# Patient Record
Sex: MALE | Race: WHITE | NOT HISPANIC OR LATINO | Employment: FULL TIME | URBAN - NONMETROPOLITAN AREA
[De-identification: names, ages, dates, MRNs, and addresses within clinical notes are randomized per-mention and may not be internally consistent; named-entity substitution may affect disease eponyms.]

---

## 2023-01-25 PROBLEM — I25.9 CARDIAC ISCHEMIA: Status: ACTIVE | Noted: 2023-01-25

## 2023-01-25 PROBLEM — F10.20 ALCOHOLISM (MULTI): Status: ACTIVE | Noted: 2023-01-25

## 2023-01-25 PROBLEM — F41.9 ANXIETY: Status: ACTIVE | Noted: 2023-01-25

## 2023-01-25 PROBLEM — F31.9 BIPOLAR DEPRESSION (MULTI): Status: ACTIVE | Noted: 2023-01-25

## 2023-01-25 PROBLEM — N52.9 ERECTILE DYSFUNCTION: Status: ACTIVE | Noted: 2023-01-25

## 2023-01-25 PROBLEM — F51.09 SITUATIONAL INSOMNIA: Status: ACTIVE | Noted: 2023-01-25

## 2023-01-25 PROBLEM — F10.10 ALCOHOL ABUSE: Status: ACTIVE | Noted: 2023-01-25

## 2023-01-25 PROBLEM — R79.89 LOW TESTOSTERONE: Status: ACTIVE | Noted: 2023-01-25

## 2023-01-25 PROBLEM — E66.3 OVERWEIGHT WITH BODY MASS INDEX (BMI) OF 28 TO 28.9 IN ADULT: Status: ACTIVE | Noted: 2023-01-25

## 2023-01-25 RX ORDER — CHOLECALCIFEROL (VITAMIN D3) 25 MCG
2 TABLET ORAL
COMMUNITY
End: 2023-03-16 | Stop reason: ALTCHOICE

## 2023-01-25 RX ORDER — BUSPIRONE HYDROCHLORIDE 15 MG/1
TABLET ORAL
COMMUNITY
End: 2023-03-16 | Stop reason: ALTCHOICE

## 2023-01-25 RX ORDER — IBUPROFEN 800 MG/1
800 TABLET ORAL EVERY 8 HOURS PRN
COMMUNITY
End: 2023-03-16 | Stop reason: ALTCHOICE

## 2023-01-25 RX ORDER — TRAZODONE HYDROCHLORIDE 50 MG/1
1 TABLET ORAL NIGHTLY PRN
COMMUNITY
Start: 2022-06-15 | End: 2023-03-16 | Stop reason: ALTCHOICE

## 2023-01-25 RX ORDER — TADALAFIL 20 MG/1
20 TABLET ORAL AS NEEDED
COMMUNITY
Start: 2022-02-28 | End: 2023-05-22 | Stop reason: SDUPTHER

## 2023-01-25 RX ORDER — LANOLIN ALCOHOL/MO/W.PET/CERES
1 CREAM (GRAM) TOPICAL NIGHTLY
COMMUNITY
Start: 2021-12-22 | End: 2023-03-16 | Stop reason: ALTCHOICE

## 2023-01-25 RX ORDER — IPRATROPIUM BROMIDE 21 UG/1
2 SPRAY, METERED NASAL 2 TIMES DAILY
COMMUNITY
Start: 2022-09-27 | End: 2023-03-16 | Stop reason: ALTCHOICE

## 2023-01-25 RX ORDER — MULTIVITAMIN
1 TABLET ORAL
COMMUNITY
End: 2023-03-16 | Stop reason: ALTCHOICE

## 2023-01-25 RX ORDER — FLUTICASONE PROPIONATE 50 MCG
2 SPRAY, SUSPENSION (ML) NASAL
COMMUNITY
Start: 2022-02-28 | End: 2023-03-16 | Stop reason: ALTCHOICE

## 2023-01-25 RX ORDER — TESTOSTERONE CYPIONATE 200 MG/ML
200 INJECTION, SOLUTION INTRAMUSCULAR
COMMUNITY
Start: 2021-12-22

## 2023-03-09 ENCOUNTER — APPOINTMENT (OUTPATIENT)
Dept: PRIMARY CARE | Facility: CLINIC | Age: 48
End: 2023-03-09
Payer: COMMERCIAL

## 2023-03-15 NOTE — PROGRESS NOTES
"Subjective   Patient ID: Jason Ceballos is a 47 y.o. male who presents for follow up on anxiety/bipolar depression    HPI   PHQ-9: 1 (0)  YVES-7: 8  Was referred to Psych at last visit and Vraylar was reduced in strength. Patient reports he took himself off of all of his medications several weeks ago and is no longer following with Psych. He states he feels great and is in a good place. He is having good quality of life, normal appetite and sleep has improved. He denies manic episodes and feels very \"level\".  He denies use of alcohol or cravings. He does go to weekly AA meetings.  He was doing an IOP at last visit as well. Now doing group therapy 1x/week and he feels this is sufficient at this time.   Recovering alcohol (completed detox)  He is doing well at work and getting promotions often. Does not feel overwhelmed at this time.  He has been able to go to the gym and exercise 1x/week and hopes to increase this to during the week as well.     C/o chest congestion  X week  States this feels instigated by allergies/irritation  Started a generic OTC allergy pill without much relief  +wheezing at times  Coughing up mucous in the morning  Denies fevers or shortness of breath    Review of Systems  See HPI    Objective   There were no vitals taken for this visit.-unable to obtain from patient this morning.    Physical Exam  Constitutional: Well developed, well nourished, alert and in no acute distress   Eyes: Normal external exam.  Pulmonary: No respiratory distress  Skin: No pallor  Neurologic: Cranial nerves II-XII grossly intact.   Psychiatric: Mood calm and affect normal. Answers questions appropriately.     Assessment/Plan   Continue your weekly counseling    Follow a healthy diet, avoid foods high in sugar/carb as this can cause irritability due to shifts in glucose level    Continue to exercise, goal is 5 days/week for 30 minutes    RESTART Atrovent nasal spray at night time before bed, perform nasal saline rinse in " the morning x 2 weeks. Please contact us if you have no improvement in your symptoms.

## 2023-03-16 ENCOUNTER — TELEMEDICINE (OUTPATIENT)
Dept: PRIMARY CARE | Facility: CLINIC | Age: 48
End: 2023-03-16
Payer: COMMERCIAL

## 2023-03-16 DIAGNOSIS — Z00.00 ENCOUNTER FOR WELLNESS EXAMINATION IN ADULT: ICD-10-CM

## 2023-03-16 DIAGNOSIS — E66.3 OVERWEIGHT WITH BODY MASS INDEX (BMI) OF 29 TO 29.9 IN ADULT: ICD-10-CM

## 2023-03-16 DIAGNOSIS — F41.9 ANXIETY: Primary | ICD-10-CM

## 2023-03-16 DIAGNOSIS — F51.09 SITUATIONAL INSOMNIA: ICD-10-CM

## 2023-03-16 DIAGNOSIS — F10.21 RECOVERING ALCOHOLIC IN REMISSION (MULTI): ICD-10-CM

## 2023-03-16 DIAGNOSIS — F31.9 BIPOLAR DEPRESSION (MULTI): ICD-10-CM

## 2023-03-16 PROBLEM — R53.81 MALAISE AND FATIGUE: Status: ACTIVE | Noted: 2023-03-16

## 2023-03-16 PROBLEM — R63.4 WEIGHT LOSS: Status: ACTIVE | Noted: 2023-03-16

## 2023-03-16 PROBLEM — R53.83 FATIGUE: Status: RESOLVED | Noted: 2023-03-16 | Resolved: 2023-03-16

## 2023-03-16 PROBLEM — R53.83 MALAISE AND FATIGUE: Status: RESOLVED | Noted: 2023-03-16 | Resolved: 2023-03-16

## 2023-03-16 PROBLEM — R53.81 MALAISE AND FATIGUE: Status: RESOLVED | Noted: 2023-03-16 | Resolved: 2023-03-16

## 2023-03-16 PROBLEM — R53.83 FATIGUE: Status: ACTIVE | Noted: 2023-03-16

## 2023-03-16 PROBLEM — F10.20 ALCOHOLISM (MULTI): Status: RESOLVED | Noted: 2023-01-25 | Resolved: 2023-03-16

## 2023-03-16 PROBLEM — R63.4 WEIGHT LOSS: Status: RESOLVED | Noted: 2023-03-16 | Resolved: 2023-03-16

## 2023-03-16 PROBLEM — R53.83 MALAISE AND FATIGUE: Status: ACTIVE | Noted: 2023-03-16

## 2023-03-16 PROCEDURE — 99213 OFFICE O/P EST LOW 20 MIN: CPT | Performed by: FAMILY MEDICINE

## 2023-03-16 RX ORDER — ACETAMINOPHEN 500 MG
1000 TABLET ORAL EVERY 8 HOURS PRN
COMMUNITY
Start: 2022-11-23 | End: 2023-06-20

## 2023-03-16 ASSESSMENT — ANXIETY QUESTIONNAIRES
7. FEELING AFRAID AS IF SOMETHING AWFUL MIGHT HAPPEN: SEVERAL DAYS
3. WORRYING TOO MUCH ABOUT DIFFERENT THINGS: SEVERAL DAYS
IF YOU CHECKED OFF ANY PROBLEMS ON THIS QUESTIONNAIRE, HOW DIFFICULT HAVE THESE PROBLEMS MADE IT FOR YOU TO DO YOUR WORK, TAKE CARE OF THINGS AT HOME, OR GET ALONG WITH OTHER PEOPLE: NOT DIFFICULT AT ALL
5. BEING SO RESTLESS THAT IT IS HARD TO SIT STILL: SEVERAL DAYS
1. FEELING NERVOUS, ANXIOUS, OR ON EDGE: SEVERAL DAYS
2. NOT BEING ABLE TO STOP OR CONTROL WORRYING: SEVERAL DAYS
4. TROUBLE RELAXING: MORE THAN HALF THE DAYS
6. BECOMING EASILY ANNOYED OR IRRITABLE: SEVERAL DAYS
GAD7 TOTAL SCORE: 8

## 2023-03-16 ASSESSMENT — PATIENT HEALTH QUESTIONNAIRE - PHQ9
7. TROUBLE CONCENTRATING ON THINGS, SUCH AS READING THE NEWSPAPER OR WATCHING TELEVISION: NOT AT ALL
3. TROUBLE FALLING OR STAYING ASLEEP OR SLEEPING TOO MUCH: NOT AT ALL
2. FEELING DOWN, DEPRESSED OR HOPELESS: NOT AT ALL
9. THOUGHTS THAT YOU WOULD BE BETTER OFF DEAD, OR OF HURTING YOURSELF: NOT AT ALL
SUM OF ALL RESPONSES TO PHQ QUESTIONS 1-9: 1
5. POOR APPETITE OR OVEREATING: NOT AT ALL
SUM OF ALL RESPONSES TO PHQ9 QUESTIONS 1 AND 2: 0
10. IF YOU CHECKED OFF ANY PROBLEMS, HOW DIFFICULT HAVE THESE PROBLEMS MADE IT FOR YOU TO DO YOUR WORK, TAKE CARE OF THINGS AT HOME, OR GET ALONG WITH OTHER PEOPLE: NOT DIFFICULT AT ALL
1. LITTLE INTEREST OR PLEASURE IN DOING THINGS: NOT AT ALL
4. FEELING TIRED OR HAVING LITTLE ENERGY: NOT AT ALL
6. FEELING BAD ABOUT YOURSELF - OR THAT YOU ARE A FAILURE OR HAVE LET YOURSELF OR YOUR FAMILY DOWN: SEVERAL DAYS
8. MOVING OR SPEAKING SO SLOWLY THAT OTHER PEOPLE COULD HAVE NOTICED. OR THE OPPOSITE, BEING SO FIGETY OR RESTLESS THAT YOU HAVE BEEN MOVING AROUND A LOT MORE THAN USUAL: NOT AT ALL

## 2023-05-22 DIAGNOSIS — N52.9 ERECTILE DYSFUNCTION, UNSPECIFIED ERECTILE DYSFUNCTION TYPE: Primary | ICD-10-CM

## 2023-05-22 RX ORDER — TADALAFIL 20 MG/1
20 TABLET ORAL AS NEEDED
Qty: 10 TABLET | Refills: 1 | Status: SHIPPED | OUTPATIENT
Start: 2023-05-22 | End: 2023-07-27 | Stop reason: SDUPTHER

## 2023-05-30 ENCOUNTER — APPOINTMENT (OUTPATIENT)
Dept: PRIMARY CARE | Facility: CLINIC | Age: 48
End: 2023-05-30
Payer: COMMERCIAL

## 2023-06-14 ENCOUNTER — TELEPHONE (OUTPATIENT)
Dept: PRIMARY CARE | Facility: CLINIC | Age: 48
End: 2023-06-14
Payer: COMMERCIAL

## 2023-06-14 NOTE — TELEPHONE ENCOUNTER
"Follow up order placed Dr. Becker now active-Patient cancelled his appt for 10-6-23 via [employer]: \"Canceled via [employer] (Hi Dr. Becker and team, I am doing very well - well-maintained mentally and physically. I have no complaints or issues. My energy is improving, am working out a few times a week, and diet is good. Doing well!)\"    This appointment was for PE-please contact patient and encourage reschedule of PE and educate that physicals are encouraged annually to maintain wellness.   "

## 2023-06-19 ENCOUNTER — TELEPHONE (OUTPATIENT)
Dept: PRIMARY CARE | Facility: CLINIC | Age: 48
End: 2023-06-19
Payer: COMMERCIAL

## 2023-06-19 NOTE — TELEPHONE ENCOUNTER
The patient was seen in the ER for Diverticulitis. He is scheduled with the provider for a follow up on Wednesday. Records have been pulled in [facility]. He is asking for a refill on Norco that he was given in the ER.  He took his last pill this morning and states he is still in a lot of distress.  He is leaving work and going home for the day.  He will follow up on Wednesday at the scheduled appointment, but would like a refill as the medication was helping with the pain. Please send to local pharmacy on file.

## 2023-06-19 NOTE — TELEPHONE ENCOUNTER
The patient was given the information. He will contact [facility] to have the records sent over. [facility] will not allow us to pull records.  No further questions at this time.

## 2023-06-20 ENCOUNTER — TELEPHONE (OUTPATIENT)
Dept: PRIMARY CARE | Facility: CLINIC | Age: 48
End: 2023-06-20
Payer: COMMERCIAL

## 2023-06-20 NOTE — TELEPHONE ENCOUNTER
We do not have access to records through [facility]. Patient was told to have records faxed to office.  Please be on the lookout

## 2023-06-20 NOTE — PROGRESS NOTES
"SUBJECTIVE:      Jason Rob Is 48 y.o.. male. Here for follow up office visit-     Chief Complaint   Patient presents with    Hospital Follow-up         HPI:      Dr Becker pt      How is pt doing today? He is still feeling \" beat up\" - pain improved but not gone       CT- sigmoid diverticulitis      Elevated WBC- 15     Cr 1.31     No fever - no vomiting     Now nausea - on abx     Follow up for hosp visit-  Diverticulitis - last Friday     Treated with- omnicef and flagyl - taking both abx       Dx- diverticulitis    Ok to refill norco for pain at night         REVIEW OF SYSTEMS:        OBJECTIVE:    Vitals:    06/21/23 1007   BP: 149/77   BP Location: Left arm   Patient Position: Sitting   BP Cuff Size: Large adult   Pulse: 75   Resp: 16   Temp: 36.7 °C (98 °F)   TempSrc: Temporal   SpO2: 95%   Weight: 96.7 kg (213 lb 3.2 oz)   Height: 1.778 m (5' 10\")       PHYSICAL:      Patient is alert and oriented x 3 , NAD  HEAD- normocephalic and atraumatic   EYES-conjunctiva-normal, lids - normal  EARS/NOSE- normal external exam   NECK-supple,FROM  CV- RRR without murmur  PULM- CTA bilaterally, normal respiratory effort  RESPIRATORY EFFORT- normal , no retractions or nasal flaring   ABD- normoactive BS's   EXT- no edema,NT  SKIN- mole right temple   NEURO- no focal deficits  PSYCH- pleasant, normal judgement and insight        The number and complexity of problems addressed is considered moderate.  The amount and/or complexity of data reviewed and analyzed is considered moderate. The risk of complications and/or morbidity/mortality of patient is considered moderate. Overall, this patient encounter is considered a moderate risk visit.          ASSESSMENT/PLAN:    Problem List Items Addressed This Visit    None  Visit Diagnoses       Hospital discharge follow-up    -  Primary    Diverticulitis        Relevant Orders    CBC and Auto Differential    Referral to Gastroenterology    Elevated serum creatinine        Relevant " Orders    Basic Metabolic Panel    Skin lesion        Relevant Orders    Referral to Dermatology            Orders Placed This Encounter   Procedures    CBC and Auto Differential    Basic Metabolic Panel    Referral to Gastroenterology    Referral to Dermatology               Continue medication      Ordered lab      Follow up with Dr Becker in October

## 2023-06-21 ENCOUNTER — OFFICE VISIT (OUTPATIENT)
Dept: PRIMARY CARE | Facility: CLINIC | Age: 48
End: 2023-06-21
Payer: COMMERCIAL

## 2023-06-21 ENCOUNTER — LAB (OUTPATIENT)
Dept: LAB | Facility: LAB | Age: 48
End: 2023-06-21
Payer: COMMERCIAL

## 2023-06-21 VITALS
RESPIRATION RATE: 16 BRPM | HEART RATE: 75 BPM | TEMPERATURE: 98 F | BODY MASS INDEX: 30.52 KG/M2 | OXYGEN SATURATION: 95 % | DIASTOLIC BLOOD PRESSURE: 77 MMHG | SYSTOLIC BLOOD PRESSURE: 149 MMHG | HEIGHT: 70 IN | WEIGHT: 213.2 LBS

## 2023-06-21 DIAGNOSIS — K57.92 DIVERTICULITIS: ICD-10-CM

## 2023-06-21 DIAGNOSIS — R79.89 ELEVATED SERUM CREATININE: ICD-10-CM

## 2023-06-21 DIAGNOSIS — L98.9 SKIN LESION: ICD-10-CM

## 2023-06-21 DIAGNOSIS — Z09 HOSPITAL DISCHARGE FOLLOW-UP: Primary | ICD-10-CM

## 2023-06-21 PROCEDURE — 36415 COLL VENOUS BLD VENIPUNCTURE: CPT

## 2023-06-21 PROCEDURE — 99214 OFFICE O/P EST MOD 30 MIN: CPT | Performed by: FAMILY MEDICINE

## 2023-06-21 PROCEDURE — 1036F TOBACCO NON-USER: CPT | Performed by: FAMILY MEDICINE

## 2023-06-21 PROCEDURE — 85025 COMPLETE CBC W/AUTO DIFF WBC: CPT

## 2023-06-21 PROCEDURE — 3008F BODY MASS INDEX DOCD: CPT | Performed by: FAMILY MEDICINE

## 2023-06-21 PROCEDURE — 80048 BASIC METABOLIC PNL TOTAL CA: CPT

## 2023-06-21 RX ORDER — HYDROCODONE BITARTRATE AND ACETAMINOPHEN 5; 325 MG/1; MG/1
TABLET ORAL
COMMUNITY
Start: 2023-06-16 | End: 2023-06-21 | Stop reason: SDUPTHER

## 2023-06-21 RX ORDER — METRONIDAZOLE 500 MG/1
500 TABLET ORAL 3 TIMES DAILY
Qty: 21 TABLET | Refills: 0 | COMMUNITY
Start: 2023-06-16 | End: 2023-06-23

## 2023-06-21 RX ORDER — CEFDINIR 300 MG/1
300 CAPSULE ORAL 2 TIMES DAILY
Qty: 14 CAPSULE | Refills: 0 | COMMUNITY
Start: 2023-06-16 | End: 2023-06-23

## 2023-06-21 RX ORDER — HYDROCODONE BITARTRATE AND ACETAMINOPHEN 5; 325 MG/1; MG/1
1 TABLET ORAL EVERY 6 HOURS PRN
Qty: 20 TABLET | Refills: 0 | Status: SHIPPED | OUTPATIENT
Start: 2023-06-21 | End: 2023-06-26

## 2023-06-22 LAB
ANION GAP IN SER/PLAS: 18 MMOL/L (ref 10–20)
BASOPHILS (10*3/UL) IN BLOOD BY AUTOMATED COUNT: 0.05 X10E9/L (ref 0–0.1)
BASOPHILS/100 LEUKOCYTES IN BLOOD BY AUTOMATED COUNT: 0.7 % (ref 0–2)
CALCIUM (MG/DL) IN SER/PLAS: 10.6 MG/DL (ref 8.6–10.6)
CARBON DIOXIDE, TOTAL (MMOL/L) IN SER/PLAS: 27 MMOL/L (ref 21–32)
CHLORIDE (MMOL/L) IN SER/PLAS: 100 MMOL/L (ref 98–107)
CREATININE (MG/DL) IN SER/PLAS: 1.45 MG/DL (ref 0.5–1.3)
EOSINOPHILS (10*3/UL) IN BLOOD BY AUTOMATED COUNT: 0.15 X10E9/L (ref 0–0.7)
EOSINOPHILS/100 LEUKOCYTES IN BLOOD BY AUTOMATED COUNT: 2.1 % (ref 0–6)
ERYTHROCYTE DISTRIBUTION WIDTH (RATIO) BY AUTOMATED COUNT: 12.5 % (ref 11.5–14.5)
ERYTHROCYTE MEAN CORPUSCULAR HEMOGLOBIN CONCENTRATION (G/DL) BY AUTOMATED: 32.6 G/DL (ref 32–36)
ERYTHROCYTE MEAN CORPUSCULAR VOLUME (FL) BY AUTOMATED COUNT: 94 FL (ref 80–100)
ERYTHROCYTES (10*6/UL) IN BLOOD BY AUTOMATED COUNT: 6.07 X10E12/L (ref 4.5–5.9)
GFR MALE: 59 ML/MIN/1.73M2
GLUCOSE (MG/DL) IN SER/PLAS: 95 MG/DL (ref 74–99)
HEMATOCRIT (%) IN BLOOD BY AUTOMATED COUNT: 57.1 % (ref 41–52)
HEMOGLOBIN (G/DL) IN BLOOD: 18.6 G/DL (ref 13.5–17.5)
IMMATURE GRANULOCYTES/100 LEUKOCYTES IN BLOOD BY AUTOMATED COUNT: 0.7 % (ref 0–0.9)
LEUKOCYTES (10*3/UL) IN BLOOD BY AUTOMATED COUNT: 7.1 X10E9/L (ref 4.4–11.3)
LYMPHOCYTES (10*3/UL) IN BLOOD BY AUTOMATED COUNT: 2.15 X10E9/L (ref 1.2–4.8)
LYMPHOCYTES/100 LEUKOCYTES IN BLOOD BY AUTOMATED COUNT: 30.2 % (ref 13–44)
MONOCYTES (10*3/UL) IN BLOOD BY AUTOMATED COUNT: 0.59 X10E9/L (ref 0.1–1)
MONOCYTES/100 LEUKOCYTES IN BLOOD BY AUTOMATED COUNT: 8.3 % (ref 2–10)
NEUTROPHILS (10*3/UL) IN BLOOD BY AUTOMATED COUNT: 4.12 X10E9/L (ref 1.2–7.7)
NEUTROPHILS/100 LEUKOCYTES IN BLOOD BY AUTOMATED COUNT: 58 % (ref 40–80)
NRBC (PER 100 WBCS) BY AUTOMATED COUNT: 0 /100 WBC (ref 0–0)
PLATELETS (10*3/UL) IN BLOOD AUTOMATED COUNT: 361 X10E9/L (ref 150–450)
POTASSIUM (MMOL/L) IN SER/PLAS: 5.2 MMOL/L (ref 3.5–5.3)
SODIUM (MMOL/L) IN SER/PLAS: 140 MMOL/L (ref 136–145)
UREA NITROGEN (MG/DL) IN SER/PLAS: 16 MG/DL (ref 6–23)

## 2023-06-24 DIAGNOSIS — D75.1 POLYCYTHEMIA: ICD-10-CM

## 2023-06-24 DIAGNOSIS — N18.9 CHRONIC KIDNEY DISEASE, UNSPECIFIED CKD STAGE: Primary | ICD-10-CM

## 2023-06-24 PROBLEM — F41.1 GAD (GENERALIZED ANXIETY DISORDER): Status: ACTIVE | Noted: 2022-06-24

## 2023-06-26 ENCOUNTER — TELEPHONE (OUTPATIENT)
Dept: PRIMARY CARE | Facility: CLINIC | Age: 48
End: 2023-06-26
Payer: COMMERCIAL

## 2023-06-26 DIAGNOSIS — Z00.00 HEALTHCARE MAINTENANCE: Primary | ICD-10-CM

## 2023-06-26 DIAGNOSIS — R94.4 DECREASED GFR: ICD-10-CM

## 2023-06-26 NOTE — TELEPHONE ENCOUNTER
----- Message from Valentine Durand MA sent at 6/26/2023  9:00 AM EDT -----  Regarding: FW: Your Recent Visit  Contact: [phone]    ----- Message -----  From: Jason Ceballos  Sent: 6/26/2023   8:28 AM EDT  To: [facility] [place] [id number] Clinical Support Staff  Subject: Your Recent Visit                                Good morning,    I scheduled an appointment for a kidney doc follow up; it's in mid-September in [place].  If you feel this appointment is needed sooner, please advise.   I know there's a scheduling department there at your office.  Thanks so much!    Edgard potts I see the gastro doc this Thurs.

## 2023-06-26 NOTE — TELEPHONE ENCOUNTER
Pls have pt recheck his kidneys nonfasting ok in about 2 weeks       Labs from hospital / follow up show some decrease in kidney function.  Many things can lead to this finding, such as dehydration, medication side effects, high blood pressure, diabetes, and use of pain relievers called NSAIDs, which include ibuprofen and naproxen.      advise good BP control, avoiding NSAIDs, and adequate hydration as measures to further protect kidneys.

## 2023-07-17 ENCOUNTER — LAB (OUTPATIENT)
Dept: LAB | Facility: LAB | Age: 48
End: 2023-07-17
Payer: COMMERCIAL

## 2023-07-17 DIAGNOSIS — R94.4 DECREASED GFR: ICD-10-CM

## 2023-07-17 LAB
ANION GAP IN SER/PLAS: 16 MMOL/L (ref 10–20)
CALCIUM (MG/DL) IN SER/PLAS: 9.7 MG/DL (ref 8.6–10.6)
CARBON DIOXIDE, TOTAL (MMOL/L) IN SER/PLAS: 28 MMOL/L (ref 21–32)
CHLORIDE (MMOL/L) IN SER/PLAS: 102 MMOL/L (ref 98–107)
CREATININE (MG/DL) IN SER/PLAS: 1.36 MG/DL (ref 0.5–1.3)
GFR MALE: 64 ML/MIN/1.73M2
GLUCOSE (MG/DL) IN SER/PLAS: 97 MG/DL (ref 74–99)
POTASSIUM (MMOL/L) IN SER/PLAS: 4.9 MMOL/L (ref 3.5–5.3)
SODIUM (MMOL/L) IN SER/PLAS: 141 MMOL/L (ref 136–145)
UREA NITROGEN (MG/DL) IN SER/PLAS: 16 MG/DL (ref 6–23)

## 2023-07-17 PROCEDURE — 80048 BASIC METABOLIC PNL TOTAL CA: CPT

## 2023-07-17 PROCEDURE — 36415 COLL VENOUS BLD VENIPUNCTURE: CPT

## 2023-07-18 ENCOUNTER — TELEPHONE (OUTPATIENT)
Dept: PRIMARY CARE | Facility: CLINIC | Age: 48
End: 2023-07-18
Payer: COMMERCIAL

## 2023-07-18 DIAGNOSIS — N18.9 CHRONIC KIDNEY DISEASE, UNSPECIFIED CKD STAGE: Primary | ICD-10-CM

## 2023-07-18 NOTE — TELEPHONE ENCOUNTER
----- Message from Jael Howe MA sent at 7/18/2023  7:08 AM EDT -----  Regarding: FW: Labs follow up - kidneys still high  Contact: [phone]    ----- Message -----  From: Jason Ceballos  Sent: 7/17/2023   8:18 PM EDT  To: [facility] [place] [id number] Clinical Support Staff  Subject: Labs follow up - kidneys still high              FYI I have been taking creatine supplements and drinking about 10 cups of coffee a day.     It's my belief my numbers will continue to fall in the coming weeks based on proper hydration and dietary changes.     For whatever that's worth.  : )

## 2023-07-27 DIAGNOSIS — N52.9 ERECTILE DYSFUNCTION, UNSPECIFIED ERECTILE DYSFUNCTION TYPE: ICD-10-CM

## 2023-07-27 RX ORDER — TADALAFIL 20 MG/1
20 TABLET ORAL AS NEEDED
Qty: 10 TABLET | Refills: 1 | Status: SHIPPED | OUTPATIENT
Start: 2023-07-27 | End: 2023-10-06 | Stop reason: SDUPTHER

## 2023-08-30 DIAGNOSIS — R05.1 ACUTE COUGH: Primary | ICD-10-CM

## 2023-08-30 RX ORDER — AZITHROMYCIN 250 MG/1
TABLET, FILM COATED ORAL
Qty: 6 TABLET | Refills: 0 | Status: SHIPPED | OUTPATIENT
Start: 2023-08-30 | End: 2023-09-04

## 2023-09-06 ENCOUNTER — OFFICE VISIT (OUTPATIENT)
Dept: PRIMARY CARE | Facility: CLINIC | Age: 48
End: 2023-09-06
Payer: COMMERCIAL

## 2023-09-06 ENCOUNTER — LAB (OUTPATIENT)
Dept: LAB | Facility: LAB | Age: 48
End: 2023-09-06
Payer: COMMERCIAL

## 2023-09-06 VITALS
TEMPERATURE: 98.7 F | RESPIRATION RATE: 14 BRPM | WEIGHT: 198.9 LBS | SYSTOLIC BLOOD PRESSURE: 131 MMHG | HEART RATE: 88 BPM | OXYGEN SATURATION: 95 % | DIASTOLIC BLOOD PRESSURE: 80 MMHG | BODY MASS INDEX: 28.54 KG/M2

## 2023-09-06 DIAGNOSIS — R79.89 LOW TESTOSTERONE: ICD-10-CM

## 2023-09-06 DIAGNOSIS — Z11.59 NEED FOR HEPATITIS C SCREENING TEST: ICD-10-CM

## 2023-09-06 DIAGNOSIS — R63.4 WEIGHT LOSS: ICD-10-CM

## 2023-09-06 DIAGNOSIS — R05.2 SUBACUTE COUGH: ICD-10-CM

## 2023-09-06 DIAGNOSIS — Z11.4 ENCOUNTER FOR SCREENING FOR HIV: ICD-10-CM

## 2023-09-06 DIAGNOSIS — R53.83 FATIGUE, UNSPECIFIED TYPE: Primary | ICD-10-CM

## 2023-09-06 DIAGNOSIS — R53.83 FATIGUE, UNSPECIFIED TYPE: ICD-10-CM

## 2023-09-06 LAB
ALANINE AMINOTRANSFERASE (SGPT) (U/L) IN SER/PLAS: 35 U/L (ref 10–52)
ALBUMIN (G/DL) IN SER/PLAS: 4.9 G/DL (ref 3.4–5)
ALKALINE PHOSPHATASE (U/L) IN SER/PLAS: 46 U/L (ref 33–120)
ANION GAP IN SER/PLAS: 15 MMOL/L (ref 10–20)
ASPARTATE AMINOTRANSFERASE (SGOT) (U/L) IN SER/PLAS: 25 U/L (ref 9–39)
BASOPHILS (10*3/UL) IN BLOOD BY AUTOMATED COUNT: 0.05 X10E9/L (ref 0–0.1)
BASOPHILS/100 LEUKOCYTES IN BLOOD BY AUTOMATED COUNT: 0.6 % (ref 0–2)
BILIRUBIN TOTAL (MG/DL) IN SER/PLAS: 0.5 MG/DL (ref 0–1.2)
C REACTIVE PROTEIN (MG/L) IN SER/PLAS: <0.1 MG/DL
CALCIUM (MG/DL) IN SER/PLAS: 10.4 MG/DL (ref 8.6–10.6)
CARBON DIOXIDE, TOTAL (MMOL/L) IN SER/PLAS: 26 MMOL/L (ref 21–32)
CHLORIDE (MMOL/L) IN SER/PLAS: 102 MMOL/L (ref 98–107)
COBALAMIN (VITAMIN B12) (PG/ML) IN SER/PLAS: 842 PG/ML (ref 211–911)
CORTISOL (UG/DL) IN SERUM - AM: 15.2 UG/DL (ref 5–20)
CREATININE (MG/DL) IN SER/PLAS: 1.45 MG/DL (ref 0.5–1.3)
EOSINOPHILS (10*3/UL) IN BLOOD BY AUTOMATED COUNT: 0.15 X10E9/L (ref 0–0.7)
EOSINOPHILS/100 LEUKOCYTES IN BLOOD BY AUTOMATED COUNT: 1.7 % (ref 0–6)
ERYTHROCYTE DISTRIBUTION WIDTH (RATIO) BY AUTOMATED COUNT: 12.7 % (ref 11.5–14.5)
ERYTHROCYTE MEAN CORPUSCULAR HEMOGLOBIN CONCENTRATION (G/DL) BY AUTOMATED: 34.2 G/DL (ref 32–36)
ERYTHROCYTE MEAN CORPUSCULAR VOLUME (FL) BY AUTOMATED COUNT: 91 FL (ref 80–100)
ERYTHROCYTES (10*6/UL) IN BLOOD BY AUTOMATED COUNT: 6.17 X10E12/L (ref 4.5–5.9)
FERRITIN (UG/LL) IN SER/PLAS: 93 UG/L (ref 20–300)
GFR MALE: 59 ML/MIN/1.73M2
GLUCOSE (MG/DL) IN SER/PLAS: 95 MG/DL (ref 74–99)
HEMATOCRIT (%) IN BLOOD BY AUTOMATED COUNT: 56.1 % (ref 41–52)
HEMOGLOBIN (G/DL) IN BLOOD: 19.2 G/DL (ref 13.5–17.5)
HEPATITIS C VIRUS AB PRESENCE IN SERUM: NONREACTIVE
HIV 1/ 2 AG/AB SCREEN: NONREACTIVE
IGA (MG/DL) IN SER/PLAS: 367 MG/DL (ref 70–400)
IGG (MG/DL) IN SER/PLAS: 1070 MG/DL (ref 700–1600)
IGM (MG/DL) IN SER/PLAS: 46 MG/DL (ref 40–230)
IMMATURE GRANULOCYTES/100 LEUKOCYTES IN BLOOD BY AUTOMATED COUNT: 0.3 % (ref 0–0.9)
IRON (UG/DL) IN SER/PLAS: 158 UG/DL (ref 35–150)
IRON BINDING CAPACITY (UG/DL) IN SER/PLAS: 328 UG/DL (ref 240–445)
IRON SATURATION (%) IN SER/PLAS: 48 % (ref 25–45)
LEUKOCYTES (10*3/UL) IN BLOOD BY AUTOMATED COUNT: 8.7 X10E9/L (ref 4.4–11.3)
LYMPHOCYTES (10*3/UL) IN BLOOD BY AUTOMATED COUNT: 2.04 X10E9/L (ref 1.2–4.8)
LYMPHOCYTES/100 LEUKOCYTES IN BLOOD BY AUTOMATED COUNT: 23.5 % (ref 13–44)
MONOCYTES (10*3/UL) IN BLOOD BY AUTOMATED COUNT: 0.69 X10E9/L (ref 0.1–1)
MONOCYTES/100 LEUKOCYTES IN BLOOD BY AUTOMATED COUNT: 7.9 % (ref 2–10)
NEUTROPHILS (10*3/UL) IN BLOOD BY AUTOMATED COUNT: 5.72 X10E9/L (ref 1.2–7.7)
NEUTROPHILS/100 LEUKOCYTES IN BLOOD BY AUTOMATED COUNT: 66 % (ref 40–80)
NRBC (PER 100 WBCS) BY AUTOMATED COUNT: 0 /100 WBC (ref 0–0)
PLATELETS (10*3/UL) IN BLOOD AUTOMATED COUNT: 270 X10E9/L (ref 150–450)
POTASSIUM (MMOL/L) IN SER/PLAS: 4.9 MMOL/L (ref 3.5–5.3)
PROTEIN TOTAL: 7.5 G/DL (ref 6.4–8.2)
SEDIMENTATION RATE, ERYTHROCYTE: 9 MM/H (ref 0–15)
SODIUM (MMOL/L) IN SER/PLAS: 138 MMOL/L (ref 136–145)
TESTOSTERONE (NG/DL) IN SER/PLAS: >3000 NG/DL (ref 240–1000)
THYROTROPIN (MIU/L) IN SER/PLAS BY DETECTION LIMIT <= 0.05 MIU/L: 1.77 MIU/L (ref 0.44–3.98)
UREA NITROGEN (MG/DL) IN SER/PLAS: 14 MG/DL (ref 6–23)

## 2023-09-06 PROCEDURE — 83540 ASSAY OF IRON: CPT

## 2023-09-06 PROCEDURE — 83550 IRON BINDING TEST: CPT

## 2023-09-06 PROCEDURE — 3008F BODY MASS INDEX DOCD: CPT | Performed by: FAMILY MEDICINE

## 2023-09-06 PROCEDURE — 86038 ANTINUCLEAR ANTIBODIES: CPT

## 2023-09-06 PROCEDURE — 82728 ASSAY OF FERRITIN: CPT

## 2023-09-06 PROCEDURE — 85652 RBC SED RATE AUTOMATED: CPT

## 2023-09-06 PROCEDURE — 82784 ASSAY IGA/IGD/IGG/IGM EACH: CPT

## 2023-09-06 PROCEDURE — 82607 VITAMIN B-12: CPT

## 2023-09-06 PROCEDURE — 82668 ASSAY OF ERYTHROPOIETIN: CPT

## 2023-09-06 PROCEDURE — 1036F TOBACCO NON-USER: CPT | Performed by: FAMILY MEDICINE

## 2023-09-06 PROCEDURE — 86803 HEPATITIS C AB TEST: CPT

## 2023-09-06 PROCEDURE — 99214 OFFICE O/P EST MOD 30 MIN: CPT | Performed by: FAMILY MEDICINE

## 2023-09-06 PROCEDURE — 86140 C-REACTIVE PROTEIN: CPT

## 2023-09-06 PROCEDURE — 80053 COMPREHEN METABOLIC PANEL: CPT

## 2023-09-06 PROCEDURE — 85025 COMPLETE CBC W/AUTO DIFF WBC: CPT

## 2023-09-06 PROCEDURE — 82533 TOTAL CORTISOL: CPT

## 2023-09-06 PROCEDURE — 36415 COLL VENOUS BLD VENIPUNCTURE: CPT

## 2023-09-06 PROCEDURE — 84443 ASSAY THYROID STIM HORMONE: CPT

## 2023-09-06 PROCEDURE — 87389 HIV-1 AG W/HIV-1&-2 AB AG IA: CPT

## 2023-09-06 PROCEDURE — 84403 ASSAY OF TOTAL TESTOSTERONE: CPT

## 2023-09-06 RX ORDER — LANOLIN ALCOHOL/MO/W.PET/CERES
1 CREAM (GRAM) TOPICAL NIGHTLY
COMMUNITY
Start: 2021-12-22 | End: 2024-02-27 | Stop reason: WASHOUT

## 2023-09-06 RX ORDER — CHOLECALCIFEROL (VITAMIN D3) 25 MCG
2 TABLET ORAL DAILY
COMMUNITY

## 2023-09-06 RX ORDER — IBUPROFEN 800 MG/1
1 TABLET ORAL EVERY 8 HOURS PRN
COMMUNITY
Start: 2022-11-23 | End: 2023-10-26 | Stop reason: WASHOUT

## 2023-09-06 RX ORDER — MULTIVITAMIN
1 TABLET ORAL DAILY
COMMUNITY

## 2023-09-06 NOTE — ASSESSMENT & PLAN NOTE
Uncertain why has prolonged symptoms with illness  Check immune related labs  Check fatigue related labs    Consider immunology referral if persists without anwers

## 2023-09-06 NOTE — PROGRESS NOTES
"Subjective   Patient ID: Jason Ceballos is a 48 y.o. male who presents for Follow-up.    Refused flu shot     Patient is here for an urgent care follow up.  Was seen on 08/23/2023 for the flu.   Was on Azithromycin and that did not work.   Still having deep fatigue,mild dizziness, a lot of chest congestion.  Productive cough, SOB. Took three one hour naps.  Just noticed his last 10 pounds, no appetite     Currenting taking OTC cold and flu meds     Worried has immune system issue  Over last 1-2 years has had long lasting illness  - respiratory \"chest\" and fatigue  Currently extreme fatigue, napping a lot  Lack appetite - lost 10 lbs  Both wife and son similar illness but resolved  Zpak seemed to help initially    6 months ago missed 2 weeks work due to similar illness    No tobacco    H/o alcohol abuse  Current 9 mos abstinent    H/o bipolar disorder    Felt fine in between illness - good energy, work 10-12 days    Discussed low T last checked - he stated he purposely dosed low so would test low to prove need for replacement  Worries he is underdosed vs what a tst clinic would do (had high T levels prior to 2/2023 low level - he was over dosing from prescribed)  Reports has followed prescribe dose for at least last month                 Review of Systems    Objective   /80 (BP Location: Left arm, Patient Position: Sitting, BP Cuff Size: Adult)   Pulse 88   Temp 37.1 °C (98.7 °F) (Temporal)   Resp 14   Wt 90.2 kg (198 lb 14.4 oz)   SpO2 95%   BMI 28.54 kg/m²     Physical Exam  Vitals reviewed.   Constitutional:       Appearance: Normal appearance.   HENT:      Head: Normocephalic.      Right Ear: Tympanic membrane, ear canal and external ear normal.      Left Ear: Tympanic membrane, ear canal and external ear normal.      Nose: Nose normal.      Mouth/Throat:      Mouth: Mucous membranes are dry.      Pharynx: Oropharynx is clear.   Eyes:      Conjunctiva/sclera: Conjunctivae normal.      Pupils: Pupils " are equal, round, and reactive to light.   Cardiovascular:      Rate and Rhythm: Normal rate and regular rhythm.      Heart sounds: Normal heart sounds.   Pulmonary:      Effort: Pulmonary effort is normal.      Breath sounds: Normal breath sounds.   Musculoskeletal:      Cervical back: Neck supple.   Lymphadenopathy:      Head:      Right side of head: No submandibular adenopathy.      Left side of head: No submandibular adenopathy.      Cervical: No cervical adenopathy.   Skin:     General: Skin is warm and dry.   Neurological:      Mental Status: He is alert.         Assessment/Plan   Problem List Items Addressed This Visit       Low testosterone     Check levels -most recent 2/2023 low at 184           Relevant Orders    Testosterone    Weight loss    Relevant Orders    Immunoglobulins, IgG, IgA, IgM    Subacute cough     Not resolved with initial viral supportive treatment and recent zpak    Normal auscultation    Chest xray ordered since prolonged         Relevant Orders    CBC and Auto Differential    Immunoglobulins, IgG, IgA, IgM    XR chest 2 views    Fatigue - Primary     Uncertain why has prolonged symptoms with illness  Check immune related labs  Check fatigue related labs    Consider immunology referral if persists without anwers             Relevant Orders    Comprehensive Metabolic Panel    CBC and Auto Differential    Iron and TIBC    Ferritin    TSH with reflex to Free T4 if abnormal    Vitamin B12    SHUKRI with Reflex to DOMONIQUE    C-Reactive Protein    Sedimentation Rate    Immunoglobulins, IgG, IgA, IgM    Cortisol AM     Other Visit Diagnoses       Encounter for screening for HIV        Relevant Orders    HIV 1/2 Antigen/Antibody Screen with Reflex to Confirmation    Need for hepatitis C screening test        Relevant Orders    Hepatitis C Antibody

## 2023-09-06 NOTE — ASSESSMENT & PLAN NOTE
Not resolved with initial viral supportive treatment and recent zpak    Normal auscultation    Chest xray ordered since prolonged

## 2023-09-07 ENCOUNTER — TELEPHONE (OUTPATIENT)
Dept: PRIMARY CARE | Facility: CLINIC | Age: 48
End: 2023-09-07
Payer: COMMERCIAL

## 2023-09-07 LAB — ANTI-NUCLEAR ANTIBODY (ANA): NEGATIVE

## 2023-09-07 NOTE — TELEPHONE ENCOUNTER
Pt called in checking the status on his labs. Pt just wanted to give you a friendly reminder to not forget about him. Pt is requesting a cb at [phone].

## 2023-09-07 NOTE — TELEPHONE ENCOUNTER
3 labs remain pending     We will contact him once we have results    Current resulted labs normal except for his high testosterone and high hemoglobin as already informed

## 2023-09-08 ENCOUNTER — TELEPHONE (OUTPATIENT)
Dept: PRIMARY CARE | Facility: CLINIC | Age: 48
End: 2023-09-08
Payer: COMMERCIAL

## 2023-09-08 DIAGNOSIS — J01.90 ACUTE NON-RECURRENT SINUSITIS, UNSPECIFIED LOCATION: Primary | ICD-10-CM

## 2023-09-08 LAB — ERYTHROPOIETIN: 5 MU/ML (ref 4–27)

## 2023-09-08 RX ORDER — AMOXICILLIN AND CLAVULANATE POTASSIUM 875; 125 MG/1; MG/1
TABLET, FILM COATED ORAL
Qty: 14 TABLET | Refills: 0 | Status: SHIPPED | OUTPATIENT
Start: 2023-09-08 | End: 2023-10-06 | Stop reason: WASHOUT

## 2023-09-08 NOTE — TELEPHONE ENCOUNTER
Patient came in and was seen on Wed by Dr. Hale and he had a lot blood work done.  He now thinks that his congestion is due to a sinus infection. Wanted to know if someone could send in RX for Antiboitics to [facility] in [facility] [phone]

## 2023-09-11 ENCOUNTER — TELEPHONE (OUTPATIENT)
Dept: PRIMARY CARE | Facility: CLINIC | Age: 48
End: 2023-09-11
Payer: COMMERCIAL

## 2023-09-11 DIAGNOSIS — R06.02 SHORTNESS OF BREATH: ICD-10-CM

## 2023-09-11 DIAGNOSIS — R53.83 OTHER FATIGUE: ICD-10-CM

## 2023-09-11 DIAGNOSIS — R05.2 SUBACUTE COUGH: ICD-10-CM

## 2023-09-11 DIAGNOSIS — D75.1 POLYCYTHEMIA: Primary | ICD-10-CM

## 2023-09-13 ENCOUNTER — TELEPHONE (OUTPATIENT)
Dept: PRIMARY CARE | Facility: CLINIC | Age: 48
End: 2023-09-13
Payer: COMMERCIAL

## 2023-10-06 ENCOUNTER — NUTRITION (OUTPATIENT)
Dept: HEMATOLOGY/ONCOLOGY | Facility: CLINIC | Age: 48
End: 2023-10-06

## 2023-10-06 ENCOUNTER — APPOINTMENT (OUTPATIENT)
Dept: PRIMARY CARE | Facility: CLINIC | Age: 48
End: 2023-10-06
Payer: COMMERCIAL

## 2023-10-06 ENCOUNTER — OFFICE VISIT (OUTPATIENT)
Dept: PRIMARY CARE | Facility: CLINIC | Age: 48
End: 2023-10-06
Payer: COMMERCIAL

## 2023-10-06 ENCOUNTER — INFUSION (OUTPATIENT)
Dept: HEMATOLOGY/ONCOLOGY | Facility: CLINIC | Age: 48
End: 2023-10-06
Payer: COMMERCIAL

## 2023-10-06 VITALS
HEIGHT: 70 IN | OXYGEN SATURATION: 97 % | WEIGHT: 200.4 LBS | BODY MASS INDEX: 28.69 KG/M2 | DIASTOLIC BLOOD PRESSURE: 84 MMHG | TEMPERATURE: 98.5 F | SYSTOLIC BLOOD PRESSURE: 129 MMHG | HEART RATE: 88 BPM | RESPIRATION RATE: 14 BRPM

## 2023-10-06 VITALS
DIASTOLIC BLOOD PRESSURE: 96 MMHG | WEIGHT: 200.07 LBS | RESPIRATION RATE: 20 BRPM | SYSTOLIC BLOOD PRESSURE: 141 MMHG | OXYGEN SATURATION: 94 % | TEMPERATURE: 97.9 F | BODY MASS INDEX: 28.71 KG/M2 | HEART RATE: 86 BPM

## 2023-10-06 DIAGNOSIS — F31.9 BIPOLAR DEPRESSION (MULTI): Chronic | ICD-10-CM

## 2023-10-06 DIAGNOSIS — D75.1 POLYCYTHEMIA: Chronic | ICD-10-CM

## 2023-10-06 DIAGNOSIS — Z00.00 ENCOUNTER FOR WELLNESS EXAMINATION IN ADULT: ICD-10-CM

## 2023-10-06 DIAGNOSIS — D75.1 POLYCYTHEMIA: Primary | ICD-10-CM

## 2023-10-06 DIAGNOSIS — R79.89 LOW TESTOSTERONE: ICD-10-CM

## 2023-10-06 DIAGNOSIS — N18.31 STAGE 3A CHRONIC KIDNEY DISEASE (MULTI): ICD-10-CM

## 2023-10-06 DIAGNOSIS — D75.1 POLYCYTHEMIA: ICD-10-CM

## 2023-10-06 DIAGNOSIS — E66.3 OVERWEIGHT WITH BODY MASS INDEX (BMI) OF 28 TO 28.9 IN ADULT: Chronic | ICD-10-CM

## 2023-10-06 DIAGNOSIS — Z00.00 ENCOUNTER FOR WELLNESS EXAMINATION IN ADULT: Primary | ICD-10-CM

## 2023-10-06 DIAGNOSIS — N52.9 ERECTILE DYSFUNCTION, UNSPECIFIED ERECTILE DYSFUNCTION TYPE: ICD-10-CM

## 2023-10-06 PROBLEM — R53.83 FATIGUE: Status: RESOLVED | Noted: 2023-09-06 | Resolved: 2023-10-06

## 2023-10-06 PROBLEM — R63.4 WEIGHT LOSS: Status: RESOLVED | Noted: 2023-09-06 | Resolved: 2023-10-06

## 2023-10-06 LAB
BASOPHILS # BLD AUTO: 0.03 X10*3/UL (ref 0–0.1)
BASOPHILS NFR BLD AUTO: 0.5 %
EOSINOPHIL # BLD AUTO: 0.13 X10*3/UL (ref 0–0.7)
EOSINOPHIL NFR BLD AUTO: 2 %
ERYTHROCYTE [DISTWIDTH] IN BLOOD BY AUTOMATED COUNT: 12.7 % (ref 11.5–14.5)
HCT VFR BLD AUTO: 49.3 % (ref 41–52)
HGB BLD-MCNC: 17.1 G/DL (ref 13.5–17.5)
IMM GRANULOCYTES # BLD AUTO: 0.02 X10*3/UL (ref 0–0.7)
IMM GRANULOCYTES NFR BLD AUTO: 0.3 % (ref 0–0.9)
LYMPHOCYTES # BLD AUTO: 1.91 X10*3/UL (ref 1.2–4.8)
LYMPHOCYTES NFR BLD AUTO: 29.1 %
MCH RBC QN AUTO: 30.6 PG (ref 26–34)
MCHC RBC AUTO-ENTMCNC: 34.7 G/DL (ref 32–36)
MCV RBC AUTO: 88 FL (ref 80–100)
MONOCYTES # BLD AUTO: 0.57 X10*3/UL (ref 0.1–1)
MONOCYTES NFR BLD AUTO: 8.7 %
NEUTROPHILS # BLD AUTO: 3.91 X10*3/UL (ref 1.2–7.7)
NEUTROPHILS NFR BLD AUTO: 59.4 %
NRBC BLD-RTO: NORMAL /100{WBCS}
PLATELET # BLD AUTO: 261 X10*3/UL (ref 150–450)
PMV BLD AUTO: 8.9 FL (ref 7.5–11.5)
RBC # BLD AUTO: 5.58 X10*6/UL (ref 4.5–5.9)
WBC # BLD AUTO: 6.6 X10*3/UL (ref 4.4–11.3)

## 2023-10-06 PROCEDURE — 99396 PREV VISIT EST AGE 40-64: CPT | Performed by: FAMILY MEDICINE

## 2023-10-06 PROCEDURE — 3008F BODY MASS INDEX DOCD: CPT | Performed by: FAMILY MEDICINE

## 2023-10-06 PROCEDURE — 85025 COMPLETE CBC W/AUTO DIFF WBC: CPT

## 2023-10-06 PROCEDURE — 82728 ASSAY OF FERRITIN: CPT

## 2023-10-06 PROCEDURE — 80048 BASIC METABOLIC PNL TOTAL CA: CPT

## 2023-10-06 PROCEDURE — 99195 PHLEBOTOMY: CPT

## 2023-10-06 PROCEDURE — 36415 COLL VENOUS BLD VENIPUNCTURE: CPT

## 2023-10-06 PROCEDURE — 1036F TOBACCO NON-USER: CPT | Performed by: FAMILY MEDICINE

## 2023-10-06 PROCEDURE — 80061 LIPID PANEL: CPT

## 2023-10-06 PROCEDURE — 83540 ASSAY OF IRON: CPT

## 2023-10-06 RX ORDER — EPINEPHRINE 0.3 MG/.3ML
0.3 INJECTION SUBCUTANEOUS EVERY 5 MIN PRN
Status: CANCELLED | OUTPATIENT
Start: 2023-10-06

## 2023-10-06 RX ORDER — DIPHENHYDRAMINE HYDROCHLORIDE 50 MG/ML
50 INJECTION INTRAMUSCULAR; INTRAVENOUS AS NEEDED
Status: CANCELLED | OUTPATIENT
Start: 2023-10-06

## 2023-10-06 RX ORDER — HEPARIN SODIUM,PORCINE/PF 10 UNIT/ML
50 SYRINGE (ML) INTRAVENOUS AS NEEDED
Status: CANCELLED | OUTPATIENT
Start: 2023-10-06

## 2023-10-06 RX ORDER — TADALAFIL 20 MG/1
20 TABLET ORAL AS NEEDED
Qty: 10 TABLET | Refills: 1 | Status: SHIPPED | OUTPATIENT
Start: 2023-10-06 | End: 2023-11-06 | Stop reason: SDUPTHER

## 2023-10-06 RX ORDER — ALBUTEROL SULFATE 0.83 MG/ML
3 SOLUTION RESPIRATORY (INHALATION) AS NEEDED
Status: CANCELLED | OUTPATIENT
Start: 2023-10-06

## 2023-10-06 RX ORDER — FAMOTIDINE 10 MG/ML
20 INJECTION INTRAVENOUS ONCE AS NEEDED
Status: CANCELLED | OUTPATIENT
Start: 2023-10-06

## 2023-10-06 RX ORDER — HEPARIN 100 UNIT/ML
500 SYRINGE INTRAVENOUS AS NEEDED
Status: CANCELLED | OUTPATIENT
Start: 2023-10-06

## 2023-10-06 ASSESSMENT — PATIENT HEALTH QUESTIONNAIRE - PHQ9
1. LITTLE INTEREST OR PLEASURE IN DOING THINGS: NOT AT ALL
10. IF YOU CHECKED OFF ANY PROBLEMS, HOW DIFFICULT HAVE THESE PROBLEMS MADE IT FOR YOU TO DO YOUR WORK, TAKE CARE OF THINGS AT HOME, OR GET ALONG WITH OTHER PEOPLE: NOT DIFFICULT AT ALL
7. TROUBLE CONCENTRATING ON THINGS, SUCH AS READING THE NEWSPAPER OR WATCHING TELEVISION: 0
6. FEELING BAD ABOUT YOURSELF - OR THAT YOU ARE A FAILURE OR HAVE LET YOURSELF OR YOUR FAMILY DOWN: NOT AT ALL
4. FEELING TIRED OR HAVING LITTLE ENERGY: NOT AT ALL
2. FEELING DOWN, DEPRESSED OR HOPELESS: NOT AT ALL
6. FEELING BAD ABOUT YOURSELF - OR THAT YOU ARE A FAILURE OR HAVE LET YOURSELF OR YOUR FAMILY DOWN: 0
SUM OF ALL RESPONSES TO PHQ QUESTIONS 1-9: 0
8. MOVING OR SPEAKING SO SLOWLY THAT OTHER PEOPLE COULD HAVE NOTICED. OR THE OPPOSITE, BEING SO FIGETY OR RESTLESS THAT YOU HAVE BEEN MOVING AROUND A LOT MORE THAN USUAL: NOT AT ALL
1. LITTLE INTEREST OR PLEASURE IN DOING THINGS: 0
3. TROUBLE FALLING OR STAYING ASLEEP OR SLEEPING TOO MUCH: 0
3. TROUBLE FALLING OR STAYING ASLEEP OR SLEEPING TOO MUCH: NOT AT ALL
9. THOUGHTS THAT YOU WOULD BE BETTER OFF DEAD, OR OF HURTING YOURSELF: 0
2. FEELING DOWN, DEPRESSED, IRRITABLE, OR HOPELESS: NOT AT ALL
5. POOR APPETITE OR OVEREATING: 0
2. FEELING DOWN, DEPRESSED, IRRITABLE, OR HOPELESS: 0
SUM OF ALL RESPONSES TO PHQ QUESTIONS 1-9: 0
8. MOVING OR SPEAKING SO SLOWLY THAT OTHER PEOPLE COULD HAVE NOTICED. OR THE OPPOSITE, BEING SO FIGETY OR RESTLESS THAT YOU HAVE BEEN MOVING AROUND A LOT MORE THAN USUAL: 0
3. TROUBLE FALLING OR STAYING ASLEEP OR SLEEPING TOO MUCH: NOT AT ALL
10. IF YOU CHECKED OFF ANY PROBLEMS, HOW DIFFICULT HAVE THESE PROBLEMS MADE IT FOR YOU TO DO YOUR WORK, TAKE CARE OF THINGS AT HOME, OR GET ALONG WITH OTHER PEOPLE: NOT DIFFICULT AT ALL
5. POOR APPETITE OR OVEREATING: NOT AT ALL
8. MOVING OR SPEAKING SO SLOWLY THAT OTHER PEOPLE COULD HAVE NOTICED. OR THE OPPOSITE, BEING SO FIGETY OR RESTLESS THAT YOU HAVE BEEN MOVING AROUND A LOT MORE THAN USUAL: NOT AT ALL
4. FEELING TIRED OR HAVING LITTLE ENERGY: 0
1. LITTLE INTEREST OR PLEASURE IN DOING THINGS: NOT AT ALL
7. TROUBLE CONCENTRATING ON THINGS, SUCH AS READING THE NEWSPAPER OR WATCHING TELEVISION: NOT AT ALL
6. FEELING BAD ABOUT YOURSELF - OR THAT YOU ARE A FAILURE OR HAVE LET YOURSELF OR YOUR FAMILY DOWN: NOT AT ALL
9. THOUGHTS THAT YOU WOULD BE BETTER OFF DEAD, OR OF HURTING YOURSELF: NOT AT ALL
9. THOUGHTS THAT YOU WOULD BE BETTER OFF DEAD, OR OF HURTING YOURSELF: NOT AT ALL
5. POOR APPETITE OR OVEREATING: NOT AT ALL
7. TROUBLE CONCENTRATING ON THINGS, SUCH AS READING THE NEWSPAPER OR WATCHING TELEVISION: NOT AT ALL
4. FEELING TIRED OR HAVING LITTLE ENERGY: NOT AT ALL

## 2023-10-06 ASSESSMENT — PAIN SCALES - GENERAL: PAINLEVEL: 0-NO PAIN

## 2023-10-06 NOTE — PROGRESS NOTES
NUTRITION Follow-up NOTE (Education)     Reason for Visit:  Jason Ceballos is a 48 y.o. male who presents for phlebotomy  Met with patient today for nutrition follow up.  Follows with Dr. Mason for elevated hemoglobin/hematocrit       Anthropometrics:    Wt Readings from Last 10 Encounters:   10/06/23 90.8 kg (200 lb 1.1 oz)   10/06/23 90.9 kg (200 lb 6.4 oz)   09/06/23 90.2 kg (198 lb 14.4 oz)   07/27/23 95.4 kg (210 lb 5.1 oz)   06/29/23 95 kg (209 lb 8 oz)   06/21/23 96.7 kg (213 lb 3.2 oz)   01/26/23 90.9 kg (200 lb 6 oz)   01/12/23 89.7 kg (197 lb 12.8 oz)   10/10/22 92.5 kg (204 lb)   09/27/22 99.8 kg (220 lb)     Lab Results   Component Value Date    WBC 6.6 10/06/2023    HGB 17.1 10/06/2023    HCT 49.3 10/06/2023    MCV 88 10/06/2023     10/06/2023        Lab Results   Component Value Date    IRON 179 (H) 09/22/2023    TIBC 327 09/22/2023    FERRITIN 81 09/22/2023            Food And Nutrient Intake:  Food and Nutrient History  Food and Nutrient History: Patient states since our last conversation he has eliminated vitamin C supplemementation.  We had reviewed previously, that this can enhance iron absorption . Patient trying to focus more on poultry, eggs, and dairy for protein, but is somewhat hesitant to cut out red meat as it is part of his diet for  muscle building and work out regimen. He has been doing some research on the Mediterranean diet and is trying to implement more leafy greens, whole grains, olive oil , avocados, and is trying to reduce red meat intake.       Interventions/Recommendations   Nutrition Education  Nutrition Education Content: Content related nutrition education  Goals: -Patient to limit red meat (due to heme iron content) to once weekly    -Reviewed with patient it is encouraged to limit red meat, as it contains mostly heme iron, which is the most easily absorbed form of iron.  Encouraged alternative protein sources including fish, poultry, eggs, nuts, dairy. Eggs/dairy   can impair absorption of iron  -Continue with avoiding vitamin C supplementation, to reduce iron absorption  -Discussed that plant based or Mediterranean diet is encouraged, where the focus of the diet is not centered around meat as the meal's foundation.   -Reviewed balance and implementing small changes, by limiting red meat (as it is a big part of work-out regimen) ,if unable to avoid completely, as diet interventions alone,do not treat elevated iron/hemoglobin, but in addition to medical treatment, can help avoid further complications       Monitoring and Evaluation     Readiness to Change : Fair  Anticipated Compliant : Fair

## 2023-10-06 NOTE — PROGRESS NOTES
"Subjective   Patient ID: Jason Ceballos is a 48 y.o. male who presents for a wellness examination. Patient declines flu vaccine today.       HPI   Diet: well rounded  Supplements/vitamins: see list  Alcohol use: none x 10 mo  Caffeine intake: Y, pill, 4-5 cups/day  Exercise: not currently  Sleep: no issues  Last dental appointment: routinely  Last eye appointment: glasses, 2 years ago  Anxiety/Depression: see below   Cscope: 7/27/23, repeat in 10 years  Fmhx colon cancer: N  CT cardiac score:  Tobacco use/Lung cancer screening: N  Recreational drug use: N  Immunizations: due for Tdap     PHQ-9: 0  YVES-7: 1  Not currently taking any medications.   Not currently doing counseling    CKD-3  Evaluated by Nephrology-told this is due to dehydration. No further follow up.    Was taking ibuprofen frequently    Taken testosterone for a decade, now following with Urology and hematology. Patient would like to see Endocrinology for continuation of his testosterone care.     Review of Systems   All other systems reviewed and are negative.    Objective   /84 (BP Location: Left arm, Patient Position: Sitting, BP Cuff Size: Large adult)   Pulse 88   Temp 36.9 °C (98.5 °F) (Temporal)   Resp 14   Ht 1.778 m (5' 10\")   Wt 90.9 kg (200 lb 6.4 oz)   SpO2 97%   BMI 28.75 kg/m²     Physical Exam  Constitutional: Well developed, well nourished, alert and in no acute distress.  Head and Face: Normocephalic, atraumatic.  Eyes: Normal external exam. Pupils equally round and reactive to light with normal accommodation and extraocular movements intact.   ENT: External inspection of ears normal, tympanic membranes visualized and normal. Nasal mucosa, septum, and turbinates normal. Oral mucosa moist, oropharynx clear.   Neck: Supple, no lymphadenopathy or masses. Thyroid not enlarged, no palpable nodules.   Cardiovascular: Regular rate and rhythm, normal S1 and S2, no murmurs, gallops, or rubs. Radial pulses normal. No peripheral " edema. No carotid bruits.   Pulmonary: No respiratory distress, lungs clear to auscultation bilaterally. No wheezes, rhonchi, rales.  Abdomen: Soft, nontender, nondistended, normal bowel sounds. No masses palpated. No hernias.  Musculoskeletal: Gait normal. Muscle strength/tone normal. Normal range of motion of all extremities.   Skin: Warm, well perfused, normal skin turgor, non-inflammed SK located on right temple area measuring 3mm x 1mm   Neurologic: Cranial nerves II-XII grossly intact. Deep tendon reflexes were 2+ and symmetric. Sensation normal bilaterally.   Psychiatric: Mood calm and affect normal.    Assessment/Plan    Recommendations for men annual wellness exam:   Colonoscopy at age 50 or earlier if positive family history of colon cancer   Discuss prostate cancer screening between ages 55-69 or sooner if family history of prostate cancer   One time screen for abdominal aortic aneurysm for men ages 65-75 who have ever smoked  Screening for lung cancer with low-dose CT in all adults age 55-77 who have a 30 pack-year smoking history and currently smoke or have quit within the past 15 years  Follow a healthy diet (Dash diet, Mediterranean diet)  Exercise 150 min/wk  Maintain healthy weight (BMI < 25)-your BMI is 28.   Do not smoke   Alcohol in moderation (up to 2 drinks/day)   Get enough sleep (7-8 hours/night)  Make sure immunizations are up to date (influenza, pneumococcal, Tdap, shingles if age > 50)-declines Tdap and flu shot today.   Visit dentist twice yearly    Schedule shave biopsy SK    Reviewed recent labs, non-fasting labs ordered for today    Referral to Endocrinology     Follow up in 1 year

## 2023-10-06 NOTE — PROGRESS NOTES
Phleb initiated at 0925, completed at 0934.   0947 patient declined observation period.  VSS. Observed for dizziness prior to discharge.  Instructed patient to increase oral fluids next 72 hours.  Patient independently ambulatory off unit in NAD and without complaints.  Gait steady.  Call back instructions reviewed.  Patient verbalized understanding.

## 2023-10-07 LAB
ANION GAP SERPL CALC-SCNC: 13 MMOL/L (ref 10–20)
BUN SERPL-MCNC: 17 MG/DL (ref 6–23)
CALCIUM SERPL-MCNC: 10.5 MG/DL (ref 8.6–10.6)
CHLORIDE SERPL-SCNC: 104 MMOL/L (ref 98–107)
CHOLEST SERPL-MCNC: 181 MG/DL (ref 0–199)
CHOLESTEROL/HDL RATIO: 3.2
CO2 SERPL-SCNC: 27 MMOL/L (ref 21–32)
CREAT SERPL-MCNC: 1.21 MG/DL (ref 0.5–1.3)
FERRITIN SERPL-MCNC: 45 NG/ML (ref 20–300)
GFR SERPL CREATININE-BSD FRML MDRD: 74 ML/MIN/1.73M*2
GLUCOSE SERPL-MCNC: 114 MG/DL (ref 74–99)
HDLC SERPL-MCNC: 57 MG/DL
IRON SATN MFR SERPL: 48 % (ref 25–45)
IRON SERPL-MCNC: 164 UG/DL (ref 35–150)
LDLC SERPL CALC-MCNC: 106 MG/DL (ref 140–190)
NON HDL CHOLESTEROL: 124 MG/DL (ref 0–149)
POTASSIUM SERPL-SCNC: 4.5 MMOL/L (ref 3.5–5.3)
SODIUM SERPL-SCNC: 139 MMOL/L (ref 136–145)
TIBC SERPL-MCNC: 343 UG/DL (ref 240–445)
TRIGL SERPL-MCNC: 90 MG/DL (ref 0–149)
UIBC SERPL-MCNC: 179 UG/DL (ref 110–370)
VLDL: 18 MG/DL (ref 0–40)

## 2023-10-20 ENCOUNTER — INFUSION (OUTPATIENT)
Dept: HEMATOLOGY/ONCOLOGY | Facility: CLINIC | Age: 48
End: 2023-10-20
Payer: COMMERCIAL

## 2023-10-20 VITALS
TEMPERATURE: 98.6 F | SYSTOLIC BLOOD PRESSURE: 137 MMHG | HEART RATE: 101 BPM | RESPIRATION RATE: 18 BRPM | DIASTOLIC BLOOD PRESSURE: 86 MMHG | OXYGEN SATURATION: 96 % | WEIGHT: 209.44 LBS | BODY MASS INDEX: 30.05 KG/M2

## 2023-10-20 DIAGNOSIS — D58.2 OTHER HEMOGLOBINOPATHIES (CMS-HCC): ICD-10-CM

## 2023-10-20 LAB
BASOPHILS # BLD AUTO: 0.05 X10*3/UL (ref 0–0.1)
BASOPHILS NFR BLD AUTO: 0.4 %
EOSINOPHIL # BLD AUTO: 0.1 X10*3/UL (ref 0–0.7)
EOSINOPHIL NFR BLD AUTO: 0.8 %
ERYTHROCYTE [DISTWIDTH] IN BLOOD BY AUTOMATED COUNT: 12.8 % (ref 11.5–14.5)
HCT VFR BLD AUTO: 44.8 % (ref 41–52)
HGB BLD-MCNC: 15.5 G/DL (ref 13.5–17.5)
IMM GRANULOCYTES # BLD AUTO: 0.05 X10*3/UL (ref 0–0.7)
IMM GRANULOCYTES NFR BLD AUTO: 0.4 % (ref 0–0.9)
LYMPHOCYTES # BLD AUTO: 1.91 X10*3/UL (ref 1.2–4.8)
LYMPHOCYTES NFR BLD AUTO: 16.1 %
MCH RBC QN AUTO: 30.8 PG (ref 26–34)
MCHC RBC AUTO-ENTMCNC: 34.6 G/DL (ref 32–36)
MCV RBC AUTO: 89 FL (ref 80–100)
MONOCYTES # BLD AUTO: 0.92 X10*3/UL (ref 0.1–1)
MONOCYTES NFR BLD AUTO: 7.8 %
NEUTROPHILS # BLD AUTO: 8.83 X10*3/UL (ref 1.2–7.7)
NEUTROPHILS NFR BLD AUTO: 74.5 %
NRBC BLD-RTO: ABNORMAL /100{WBCS}
PLATELET # BLD AUTO: 326 X10*3/UL (ref 150–450)
PMV BLD AUTO: 8.9 FL (ref 7.5–11.5)
RBC # BLD AUTO: 5.03 X10*6/UL (ref 4.5–5.9)
WBC # BLD AUTO: 11.9 X10*3/UL (ref 4.4–11.3)

## 2023-10-20 PROCEDURE — 36415 COLL VENOUS BLD VENIPUNCTURE: CPT

## 2023-10-20 PROCEDURE — 83550 IRON BINDING TEST: CPT

## 2023-10-20 PROCEDURE — 85025 COMPLETE CBC W/AUTO DIFF WBC: CPT

## 2023-10-20 PROCEDURE — 84075 ASSAY ALKALINE PHOSPHATASE: CPT | Mod: CMCLAB

## 2023-10-20 PROCEDURE — 82728 ASSAY OF FERRITIN: CPT

## 2023-10-20 PROCEDURE — 83540 ASSAY OF IRON: CPT

## 2023-10-20 PROCEDURE — 99195 PHLEBOTOMY: CPT

## 2023-10-20 RX ORDER — HEPARIN 100 UNIT/ML
500 SYRINGE INTRAVENOUS AS NEEDED
OUTPATIENT
Start: 2023-10-20

## 2023-10-20 RX ORDER — DIPHENHYDRAMINE HYDROCHLORIDE 50 MG/ML
50 INJECTION INTRAMUSCULAR; INTRAVENOUS AS NEEDED
OUTPATIENT
Start: 2023-10-20

## 2023-10-20 RX ORDER — EPINEPHRINE 0.3 MG/.3ML
0.3 INJECTION SUBCUTANEOUS EVERY 5 MIN PRN
OUTPATIENT
Start: 2023-10-20

## 2023-10-20 RX ORDER — HEPARIN SODIUM,PORCINE/PF 10 UNIT/ML
50 SYRINGE (ML) INTRAVENOUS AS NEEDED
OUTPATIENT
Start: 2023-10-20

## 2023-10-20 RX ORDER — ALBUTEROL SULFATE 0.83 MG/ML
3 SOLUTION RESPIRATORY (INHALATION) AS NEEDED
OUTPATIENT
Start: 2023-10-20

## 2023-10-20 RX ORDER — FAMOTIDINE 10 MG/ML
20 INJECTION INTRAVENOUS ONCE AS NEEDED
OUTPATIENT
Start: 2023-10-20

## 2023-10-20 ASSESSMENT — PAIN SCALES - GENERAL: PAINLEVEL: 0-NO PAIN

## 2023-10-20 NOTE — PROGRESS NOTES
Phlebotomy started at 0900, completed at 0910. Full 500ml's of RBC's obtained. Pt tolerated procedure well.  Drank appropriate fluids and was encouraged to continue for the next 24 hours.  Post vital signs stable see above. Denies lightheadedness/dizziness. Discharged home ambulatory asymptomatic and offering no complaints.

## 2023-10-21 LAB
ALBUMIN SERPL BCP-MCNC: 4.4 G/DL (ref 3.4–5)
ALP SERPL-CCNC: 36 U/L (ref 33–120)
ALT SERPL W P-5'-P-CCNC: 48 U/L (ref 10–52)
ANION GAP SERPL CALC-SCNC: 16 MMOL/L (ref 10–20)
AST SERPL W P-5'-P-CCNC: 35 U/L (ref 9–39)
BILIRUB SERPL-MCNC: 0.4 MG/DL (ref 0–1.2)
BUN SERPL-MCNC: 25 MG/DL (ref 6–23)
CALCIUM SERPL-MCNC: 10 MG/DL (ref 8.6–10.6)
CHLORIDE SERPL-SCNC: 104 MMOL/L (ref 98–107)
CO2 SERPL-SCNC: 24 MMOL/L (ref 21–32)
CREAT SERPL-MCNC: 1.34 MG/DL (ref 0.5–1.3)
FERRITIN SERPL-MCNC: 33 NG/ML (ref 20–300)
GFR SERPL CREATININE-BSD FRML MDRD: 65 ML/MIN/1.73M*2
GLUCOSE SERPL-MCNC: 95 MG/DL (ref 74–99)
IRON SATN MFR SERPL: 14 % (ref 25–45)
IRON SERPL-MCNC: 52 UG/DL (ref 35–150)
POTASSIUM SERPL-SCNC: 4.5 MMOL/L (ref 3.5–5.3)
PROT SERPL-MCNC: 6.8 G/DL (ref 6.4–8.2)
SODIUM SERPL-SCNC: 139 MMOL/L (ref 136–145)
TIBC SERPL-MCNC: 370 UG/DL (ref 240–445)
UIBC SERPL-MCNC: 318 UG/DL (ref 110–370)

## 2023-10-26 ENCOUNTER — OFFICE VISIT (OUTPATIENT)
Dept: PRIMARY CARE | Facility: CLINIC | Age: 48
End: 2023-10-26
Payer: COMMERCIAL

## 2023-10-26 ENCOUNTER — APPOINTMENT (OUTPATIENT)
Dept: PRIMARY CARE | Facility: CLINIC | Age: 48
End: 2023-10-26
Payer: COMMERCIAL

## 2023-10-26 VITALS
WEIGHT: 207.4 LBS | DIASTOLIC BLOOD PRESSURE: 68 MMHG | RESPIRATION RATE: 14 BRPM | HEART RATE: 81 BPM | BODY MASS INDEX: 29.76 KG/M2 | SYSTOLIC BLOOD PRESSURE: 154 MMHG | OXYGEN SATURATION: 95 % | TEMPERATURE: 98.3 F

## 2023-10-26 DIAGNOSIS — J02.9 SORE THROAT: ICD-10-CM

## 2023-10-26 DIAGNOSIS — D72.829 LEUKOCYTOSIS, UNSPECIFIED TYPE: Primary | ICD-10-CM

## 2023-10-26 PROCEDURE — 99213 OFFICE O/P EST LOW 20 MIN: CPT | Performed by: FAMILY MEDICINE

## 2023-10-26 PROCEDURE — 3008F BODY MASS INDEX DOCD: CPT | Performed by: FAMILY MEDICINE

## 2023-10-26 PROCEDURE — 1036F TOBACCO NON-USER: CPT | Performed by: FAMILY MEDICINE

## 2023-10-26 ASSESSMENT — ENCOUNTER SYMPTOMS: SORE THROAT: 1

## 2023-10-26 NOTE — PROGRESS NOTES
Answers submitted by the patient for this visit:  Sore Throat Questionnaire (Submitted on 10/26/2023)  Chief Complaint: Sore throat  Chronicity: recurrent  Onset: in the past 7 days  Pain worse on: neither  Fever: no fever  Pain - numeric: 3/10  strep: No  mono: No

## 2023-10-26 NOTE — PROGRESS NOTES
Subjective   Patient ID: Jason Ceballos is a 48 y.o. male who presents for Sore Throat (X approx 1 week/Pt has concerns about elevates WBC count (labs done 10/20/2023)).    HPI   Has had a sore throat x 1 week  +chronic sinus drainage  ?allergies  No fevers  Sore throat is worse at night time  He has a cough/itch without production  Son is currently sick with URI  Taking ibuprofen daily  Feels he has a mild cold/URI  No covid testing    Review of Systems  See HPI    Objective   /68 (BP Location: Left arm, Patient Position: Sitting, BP Cuff Size: Large adult) Comment: PT took pre-workout 2 hours ago  Pulse 81   Temp 36.8 °C (98.3 °F)   Resp 14   Wt 94.1 kg (207 lb 6.4 oz)   SpO2 95%   BMI 29.76 kg/m²     Physical Exam  Constitutional: Well developed, well nourished, alert and in no acute distress.  Head and Face: NC/AT  Eyes: Normal external exam.  ENT: External inspection of ears normal, tympanic membranes visualized and normal. Nasal mucosa and turbinates swollen and erythematous, clear nasal discharge present. Oral mucosa moist, oropharynx clear without tonsillar exudate or erythema.   Neck: Supple. No cervical lymphadenopathy   Cardiovascular: Regular rate and rhythm, normal S1 and S2, no murmurs, gallops, or rubs.   Pulmonary: No respiratory distress, lungs clear to auscultation bilaterally. No wheezes, rhonchi, rales.  Skin: Warm, well perfused, normal skin turgor and color.   Neurologic: Cranial nerves II-XII grossly intact.    Assessment/Plan   Repeat CBC    Drink at least 6-8 glasses of water daily to thin secretions.  Mucinex can be used if secretions remain thick.      A teaspoon of honey every 4 hours as needed for cough has been shown to reduce cough as well or better than over-the-counter cough suppressants.  Delsym or Robitussin are recommended to stop cough.  Cough drops can also be helpful.     For nasal congestion, please use Tonny Med Sinus Rinse at least once daily to rinse out your  sinuses. You can also try Flonase nasal spray over the counter - 1-2 sprays in each nostril daily. You can also consider Sudafed or Phenylephrine for nasal congestion but avoid if have hypertension and be aware can stimulate and cause problems sleeping.  Do not use for more than 5 days. Afrin decongestant nasal spray (oxymetazoline) can also be used for 2-3 days only.     For drainage problems, try Allegra, Claritin or Zyrtec.      For sore throat, try honey in tea, Chloraseptic, Cepacol throat lozenges, and salt water gargles.      Fever or aches can be helped by taking acetaminophen (Tylenol) every four hours as needed, or ibuprofen (Motrin, Advil) or naproxen (Aleve) as directed if you are able.   Maximum dosing of ibuprofen is 800 mg every 8 hours and maximum dose of tylenol is 1,000 mg every 8 hours - do not use for longer than 1 week unless directed by your doctor.       If you should develop a fever and worsening cough or nasal secretions with consistent yellow or green phlegm, please contact us.

## 2023-11-03 ENCOUNTER — APPOINTMENT (OUTPATIENT)
Dept: HEMATOLOGY/ONCOLOGY | Facility: CLINIC | Age: 48
End: 2023-11-03
Payer: COMMERCIAL

## 2023-11-06 DIAGNOSIS — N52.9 ERECTILE DYSFUNCTION, UNSPECIFIED ERECTILE DYSFUNCTION TYPE: ICD-10-CM

## 2023-11-06 RX ORDER — TADALAFIL 20 MG/1
20 TABLET ORAL AS NEEDED
Qty: 10 TABLET | Refills: 1 | Status: SHIPPED | OUTPATIENT
Start: 2023-11-06 | End: 2023-12-27 | Stop reason: SDUPTHER

## 2023-11-10 ENCOUNTER — APPOINTMENT (OUTPATIENT)
Dept: PRIMARY CARE | Facility: CLINIC | Age: 48
End: 2023-11-10
Payer: COMMERCIAL

## 2023-11-17 ENCOUNTER — APPOINTMENT (OUTPATIENT)
Dept: HEMATOLOGY/ONCOLOGY | Facility: CLINIC | Age: 48
End: 2023-11-17
Payer: COMMERCIAL

## 2023-11-30 ENCOUNTER — TELEPHONE (OUTPATIENT)
Dept: HEMATOLOGY/ONCOLOGY | Facility: CLINIC | Age: 48
End: 2023-11-30
Payer: COMMERCIAL

## 2023-11-30 ENCOUNTER — TELEPHONE (OUTPATIENT)
Dept: SCHEDULING | Age: 48
End: 2023-11-30
Payer: COMMERCIAL

## 2023-11-30 NOTE — TELEPHONE ENCOUNTER
Patient on schedule to be seen with phlebotomy tomorrow.  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  He believes if he donates on Friday he will not be able to give plasma.      Since he has been coming his ranges have dropped.  He would like to know I he can skip tomorrow physician appointment and phlebotomy and he is already scheduled for both on December 14th.    Please advise.

## 2023-11-30 NOTE — TELEPHONE ENCOUNTER
Patient called in to cancel his Phleb appt for tomorrow, 12/01. 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 He declined to reschedule stating he will be fine until his FUV and Phleb on 12/14.

## 2023-12-01 ENCOUNTER — APPOINTMENT (OUTPATIENT)
Dept: HEMATOLOGY/ONCOLOGY | Facility: CLINIC | Age: 48
End: 2023-12-01
Payer: COMMERCIAL

## 2023-12-08 DIAGNOSIS — U07.1 COVID-19: Primary | ICD-10-CM

## 2023-12-08 DIAGNOSIS — R09.81 NASAL CONGESTION: ICD-10-CM

## 2023-12-08 RX ORDER — AZITHROMYCIN 250 MG/1
TABLET, FILM COATED ORAL
Qty: 6 TABLET | Refills: 0 | Status: SHIPPED | OUTPATIENT
Start: 2023-12-08 | End: 2023-12-13

## 2023-12-08 RX ORDER — IPRATROPIUM BROMIDE 21 UG/1
2 SPRAY, METERED NASAL EVERY 12 HOURS
Qty: 30 ML | Refills: 0 | Status: SHIPPED | OUTPATIENT
Start: 2023-12-08 | End: 2024-01-07

## 2023-12-14 ENCOUNTER — DOCUMENTATION (OUTPATIENT)
Dept: HEMATOLOGY/ONCOLOGY | Facility: CLINIC | Age: 48
End: 2023-12-14

## 2023-12-14 ENCOUNTER — APPOINTMENT (OUTPATIENT)
Dept: LAB | Facility: CLINIC | Age: 48
End: 2023-12-14
Payer: COMMERCIAL

## 2023-12-14 ENCOUNTER — OFFICE VISIT (OUTPATIENT)
Dept: HEMATOLOGY/ONCOLOGY | Facility: CLINIC | Age: 48
End: 2023-12-14
Payer: COMMERCIAL

## 2023-12-14 ENCOUNTER — INFUSION (OUTPATIENT)
Dept: HEMATOLOGY/ONCOLOGY | Facility: CLINIC | Age: 48
End: 2023-12-14

## 2023-12-14 VITALS
TEMPERATURE: 97.3 F | OXYGEN SATURATION: 93 % | DIASTOLIC BLOOD PRESSURE: 100 MMHG | HEART RATE: 84 BPM | BODY MASS INDEX: 28.72 KG/M2 | RESPIRATION RATE: 16 BRPM | SYSTOLIC BLOOD PRESSURE: 151 MMHG | HEIGHT: 70 IN | WEIGHT: 200.62 LBS

## 2023-12-14 DIAGNOSIS — N18.31 STAGE 3A CHRONIC KIDNEY DISEASE (MULTI): ICD-10-CM

## 2023-12-14 DIAGNOSIS — D58.2 OTHER HEMOGLOBINOPATHIES (CMS-HCC): ICD-10-CM

## 2023-12-14 DIAGNOSIS — D75.1 POLYCYTHEMIA: Primary | ICD-10-CM

## 2023-12-14 DIAGNOSIS — D75.1 POLYCYTHEMIA: Chronic | ICD-10-CM

## 2023-12-14 LAB
ERYTHROCYTE [DISTWIDTH] IN BLOOD BY AUTOMATED COUNT: 12.5 % (ref 11.5–14.5)
HCT VFR BLD AUTO: 48.2 % (ref 41–52)
HGB BLD-MCNC: 16.1 G/DL (ref 13.5–17.5)
MCH RBC QN AUTO: 28.9 PG (ref 26–34)
MCHC RBC AUTO-ENTMCNC: 33.4 G/DL (ref 32–36)
MCV RBC AUTO: 86 FL (ref 80–100)
NRBC BLD-RTO: NORMAL /100{WBCS}
PLATELET # BLD AUTO: 258 X10*3/UL (ref 150–450)
RBC # BLD AUTO: 5.58 X10*6/UL (ref 4.5–5.9)
WBC # BLD AUTO: 5.9 X10*3/UL (ref 4.4–11.3)

## 2023-12-14 PROCEDURE — 83540 ASSAY OF IRON: CPT

## 2023-12-14 PROCEDURE — 99214 OFFICE O/P EST MOD 30 MIN: CPT | Performed by: INTERNAL MEDICINE

## 2023-12-14 PROCEDURE — 85027 COMPLETE CBC AUTOMATED: CPT

## 2023-12-14 PROCEDURE — 36415 COLL VENOUS BLD VENIPUNCTURE: CPT

## 2023-12-14 PROCEDURE — 1036F TOBACCO NON-USER: CPT | Performed by: INTERNAL MEDICINE

## 2023-12-14 PROCEDURE — 80053 COMPREHEN METABOLIC PANEL: CPT

## 2023-12-14 PROCEDURE — 81256 HFE GENE: CPT

## 2023-12-14 PROCEDURE — 82728 ASSAY OF FERRITIN: CPT

## 2023-12-14 PROCEDURE — 3008F BODY MASS INDEX DOCD: CPT | Performed by: INTERNAL MEDICINE

## 2023-12-14 ASSESSMENT — PAIN SCALES - GENERAL: PAINLEVEL: 0-NO PAIN

## 2023-12-14 NOTE — PROGRESS NOTES
KOMAL ARCEO is a 48 year old Male referred for evaluation of elevated hemoglobin.  MPN studies were negative.  Erythrocytosis most likely related to testosterone use.        Interval History:    History of Present Illness:     A 48-year -old without significant past medical history, currently receiving testosterone therapy was referred for evaluation of elevated hemoglobin, hematocrit.  The patient is asymptomatic.     Denied history of weight loss, fevers, night sweats, chest pain , sob, nausea, vomitting, diarrhea, hematemesis,melena.     Past Medical History:     Elevated hemoglobin/hematocrit     Past Surgical History:     Knee surgery torn tendon repairulner nerve reposition both arms     Colonoscopy:          Family History:     Mother had bilateral mastectomies     Biological father congenital heart disease and  from it in his 60s.     Personal ans Social History:     48-years -old, , has 11 year old son,.  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  No ho smoking, alcohol or drug abuse.           Review of Systems:   ·  System Review All other systems have been reviewed and are negative for complaint.            Allergies and Intolerances:       Intolerances:         predniSONE: Drug, Unknown, Active     Outpatient Medication Profile:  * Outpatient Medication Status not yet specified          Surg History:         History of knee surgery: ICD-10: Z98.890, Status: Active         History of hand surgery: ICD-10: Z98.890, Status: Active         History of shoulder surgery: ICD-10: Z98.890, Status: Active         History of surgery on arm: ICD-10: Z98.890, Status: Active     Family History: No Family History items are recorded  in the problem list.      Social History:   Social Substance History:  ·  Social History denies smoking, alcohol and drug use   ·  Smoking Status never smoker   ·  Tobacco Use denies   ·  Alcohol Use denies   ·  Drug Use denies            Vitals and Measurements:   Vitals: Temp: 37.1   HR: 78  RR: NA  BP: 131/90  SPO2%:   78   Measurements: HT(cm): 178.7  WT(kg): 91.3  BSA: 2.12   BMI:  28.5         Lab Results:           Assessment and Plan:   MPN studies were negative.  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        A 48-year -old without significant past medical history, currently receiving testosterone therapy was referred for evaluation of elevated hemoglobin, hematocrit.  The patient is asymptomatic.     Had a detailed discussion with the patient and explained about significance and manifestation with elevated hemoglobin. differential diagnosis includes myeloproliferative syndrome, secondary cuases such as drug induces, chronic hypoxia vs others.  .  Suggest  Patient requesting to donate blood at the [facility]/blood bank  Decrease Testosterone dose as much as possible.  Dietary consult.  Return in 6 months

## 2023-12-14 NOTE — PROGRESS NOTES
Patient seen by Dr. Mason today in clinic. Reinforcement education provided regarding next steps with plan of care.  Pt states he has donated plasma at his new employment.    Per Dr. Mason's note today:  Patient requesting to donate blood at the [facility]/blood bank  Decrease Testosterone dose as much as possible.  Dietary consult.  Return in 6 months    Add on labs today for hemochromatosis studies.  Will watch for results and call pt if any change to plan of care.  Pt agreeable and states he is on MyChart and able to view results.  Patient verbalizes understanding of plan of care via teachback method.

## 2023-12-15 LAB
ALBUMIN SERPL BCP-MCNC: 4.7 G/DL (ref 3.4–5)
ALP SERPL-CCNC: 43 U/L (ref 33–120)
ALT SERPL W P-5'-P-CCNC: 40 U/L (ref 10–52)
ANION GAP SERPL CALC-SCNC: 14 MMOL/L (ref 10–20)
AST SERPL W P-5'-P-CCNC: 30 U/L (ref 9–39)
BILIRUB SERPL-MCNC: 0.3 MG/DL (ref 0–1.2)
BUN SERPL-MCNC: 12 MG/DL (ref 6–23)
CALCIUM SERPL-MCNC: 10 MG/DL (ref 8.6–10.6)
CHLORIDE SERPL-SCNC: 103 MMOL/L (ref 98–107)
CO2 SERPL-SCNC: 26 MMOL/L (ref 21–32)
CREAT SERPL-MCNC: 1.36 MG/DL (ref 0.5–1.3)
FERRITIN SERPL-MCNC: 52 NG/ML (ref 20–300)
GFR SERPL CREATININE-BSD FRML MDRD: 64 ML/MIN/1.73M*2
GLUCOSE SERPL-MCNC: 110 MG/DL (ref 74–99)
IRON SATN MFR SERPL: 18 % (ref 25–45)
IRON SERPL-MCNC: 60 UG/DL (ref 35–150)
POTASSIUM SERPL-SCNC: 4.6 MMOL/L (ref 3.5–5.3)
PROT SERPL-MCNC: 7.3 G/DL (ref 6.4–8.2)
SODIUM SERPL-SCNC: 138 MMOL/L (ref 136–145)
TIBC SERPL-MCNC: 326 UG/DL (ref 240–445)
UIBC SERPL-MCNC: 266 UG/DL (ref 110–370)

## 2023-12-18 LAB
ELECTRONICALLY SIGNED BY: ABNORMAL
HFE GENE MUT TESTED BLD/T: ABNORMAL
HFE P.C282Y BLD/T QL: ABNORMAL
HFE P.H63D BLD/T QL: NORMAL

## 2023-12-21 NOTE — PROGRESS NOTES
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 These results neither confirm nor rule out the diagnosis of hereditary hemochromatosis in this individual. The HFE C282Y mutation, either in the homozygous state or in combination with the HFE H63D mutation, accounts for more than 90% of the hereditary hemochromatosis patients studied. However, ten percent of healthy Caucasians of [place]  background are unaffected carriers of this mutation, and the relative risk for hemochromatosis among HFE C282Y heterozygotes has not been demonstrated to differ from that of those with no mutation in HFE. For those with a personal history of definite or possible hereditary hemochromatosis, additional evaluations to identify other causes of disease should be considered for future medical management. Further genetic testing is recommended for any first-degree relative of this individual who has been diagnosed with hereditary hemochromatosis, to determine the presence of an additional mutated HFE allele in the family.     METHODOLOGY  DNA was extracted from the specimen provided and analyzed using allele-specific TaqMan MGB probes following PCR.      DISCLAIMER  This laboratory developed test was developed and its analytical performance characteristics have been determined by [facility]. This test has not been cleared or approved by the FDA; however, the FDA has determined that such  approval is not necessary. The [facility] is CAP accredited and certified under the Clinical Laboratory Improvement Amendments of 1988 (CLIA-88) as qualified to perform high complexity testing.   Electronically signed and reported by Elizabeth Geiger MD PhD [facility]   Resulting Agency [facility]

## 2023-12-27 DIAGNOSIS — N52.9 ERECTILE DYSFUNCTION, UNSPECIFIED ERECTILE DYSFUNCTION TYPE: ICD-10-CM

## 2023-12-27 RX ORDER — TADALAFIL 20 MG/1
20 TABLET ORAL AS NEEDED
Qty: 10 TABLET | Refills: 1 | Status: SHIPPED | OUTPATIENT
Start: 2023-12-27 | End: 2024-02-12 | Stop reason: SDUPTHER

## 2023-12-28 ENCOUNTER — APPOINTMENT (OUTPATIENT)
Dept: PRIMARY CARE | Facility: CLINIC | Age: 48
End: 2023-12-28
Payer: COMMERCIAL

## 2024-01-03 DIAGNOSIS — R79.89 LOW TESTOSTERONE: Primary | ICD-10-CM

## 2024-02-12 DIAGNOSIS — N52.9 ERECTILE DYSFUNCTION, UNSPECIFIED ERECTILE DYSFUNCTION TYPE: ICD-10-CM

## 2024-02-12 RX ORDER — TADALAFIL 20 MG/1
20 TABLET ORAL AS NEEDED
Qty: 10 TABLET | Refills: 1 | Status: SHIPPED | OUTPATIENT
Start: 2024-02-12 | End: 2024-04-19 | Stop reason: SDUPTHER

## 2024-02-27 ENCOUNTER — OFFICE VISIT (OUTPATIENT)
Dept: PRIMARY CARE | Facility: CLINIC | Age: 49
End: 2024-02-27
Payer: COMMERCIAL

## 2024-02-27 VITALS
TEMPERATURE: 98.2 F | SYSTOLIC BLOOD PRESSURE: 144 MMHG | WEIGHT: 199.9 LBS | DIASTOLIC BLOOD PRESSURE: 83 MMHG | OXYGEN SATURATION: 97 % | RESPIRATION RATE: 12 BRPM | BODY MASS INDEX: 28.68 KG/M2 | HEART RATE: 83 BPM

## 2024-02-27 DIAGNOSIS — L82.0 INFLAMED SEBORRHEIC KERATOSIS: Primary | ICD-10-CM

## 2024-02-27 PROCEDURE — 17003 DESTRUCT PREMALG LES 2-14: CPT | Performed by: FAMILY MEDICINE

## 2024-02-27 PROCEDURE — 17000 DESTRUCT PREMALG LESION: CPT | Performed by: FAMILY MEDICINE

## 2024-02-27 PROCEDURE — 1036F TOBACCO NON-USER: CPT | Performed by: FAMILY MEDICINE

## 2024-02-27 PROCEDURE — 3008F BODY MASS INDEX DOCD: CPT | Performed by: FAMILY MEDICINE

## 2024-02-27 NOTE — PROGRESS NOTES
Subjective   Patient ID: Jason Ceballos is a 48 y.o. male who presents for cryotherapy.    HPI   Patient ID: Jason Ceballos is a 48 y.o. male.    Destruction of lesion    Date/Time: 2/27/2024 7:41 AM    Performed by: Martine Becker DO  Authorized by: Martine Becker DO    Number of Lesions: 2  Lesion 1:     Body area: head/neck    Head/neck location: forehead (right temple region)    Initial size (mm): 3    Final defect size (mm): 3    Malignancy: benign lesion      Destruction method: cryotherapy    Lesion 2:     Body area: head/neck    Head/neck location: neck (right clavicular region)    Initial size (mm): 1.5    Malignancy: benign lesion      Destruction method: cryotherapy      Review of Systems  See HPI    Objective   /83 (BP Location: Left arm, Patient Position: Sitting, BP Cuff Size: Large adult)   Pulse 83   Temp 36.8 °C (98.2 °F) (Temporal)   Resp 12   Wt 90.7 kg (199 lb 14.4 oz)   SpO2 97%   BMI 28.68 kg/m²     Physical Exam  Constitutional: Well developed, well nourished, alert and in no acute distress   Eyes: Normal external exam.   Cardiovascular: No peripheral edema.  Pulmonary: No respiratory distress  Skin: Warm, well perfused, 3mm inflammed SK noted on right temple region, 1.5mm inflammed SK noted on right lower neck  Neurologic: Cranial nerves II-XII grossly intact.   Psychiatric: Mood calm and affect normal.    Assessment/Plan   Patient tolerated the procedure well    We discussed that you may experience lingering discomfort, redness, blistering of the cryotherapy sites. Please keep the sites clean if the blister ruptures.  The skin lesions should resolve themselves with in 1-2 weeks. However, a second cryotherapy procedure is sometimes required.    Please contact us with any concerns.

## 2024-03-12 ENCOUNTER — APPOINTMENT (OUTPATIENT)
Dept: PRIMARY CARE | Facility: CLINIC | Age: 49
End: 2024-03-12
Payer: COMMERCIAL

## 2024-03-12 ENCOUNTER — APPOINTMENT (OUTPATIENT)
Dept: ENDOCRINOLOGY | Facility: CLINIC | Age: 49
End: 2024-03-12
Payer: COMMERCIAL

## 2024-03-13 ENCOUNTER — TELEMEDICINE (OUTPATIENT)
Dept: PRIMARY CARE | Facility: CLINIC | Age: 49
End: 2024-03-13
Payer: COMMERCIAL

## 2024-03-13 DIAGNOSIS — F41.1 GAD (GENERALIZED ANXIETY DISORDER): ICD-10-CM

## 2024-03-13 DIAGNOSIS — F31.10 BIPOLAR I DISORDER WITH MANIA (MULTI): Primary | Chronic | ICD-10-CM

## 2024-03-13 PROCEDURE — 99213 OFFICE O/P EST LOW 20 MIN: CPT | Performed by: FAMILY MEDICINE

## 2024-03-13 PROCEDURE — 1036F TOBACCO NON-USER: CPT | Performed by: FAMILY MEDICINE

## 2024-03-13 PROCEDURE — 3008F BODY MASS INDEX DOCD: CPT | Performed by: FAMILY MEDICINE

## 2024-03-13 NOTE — PROGRESS NOTES
"Subjective   Patient ID: Jason Ceballos is a 48 y.o. male who presents for a follow up on mood.    HPI   Has not been following with Psych () for the last several months due to feeling stable and doing well.   He was previously taking Vraylar, Buspirone but did not do well with the medication, experienced flat affect and emotional numbness.  Prior concerns with alcohol use, s/p detox, had been going to weekly AA meetings and has completed an IOP and group therapy. Reports no current alcohol use and has not been doing any form of therapy recently.   Consideration of diagnosis of PTSD and borderline personality disorder, per prior psych notes.  Patient would like to avoid medication but agrees to therapy at this time.  Work is aware of the patient's situation and is supportive, as is his significant other.  He reports hypermania, feeling significant energy, sympathetic response, lack of need to take breaks from work to eat, however, he is still eating outside of work.   Describes this episode as feeling he is in \"6th gear\" and always on the go, pressured speech, multiple racing thoughts, fixated on Christianity purpose and prayer devotion, described in the past he had a God like complex, but not currently.   He reports he is very successful at work and has created self-pressure to succeed.  States he is sleeping 7 hours/night, no issues.  Over the last few days has been able to get himself out of 6th gear and back into 1-2 gear. He is aware of his elyssa and has been using cognitive therapy skills to intentionally remain in a calmer state.   Elyssa has lasted weeks to months for the patient in the past.     Review of Systems  See HPI    Objective   There were no vitals taken for this visit.    Physical Exam  Constitutional: Well developed, well nourished, alert and in no acute distress   Eyes: Normal external exam.   Pulmonary: No respiratory distress  Skin: Warm, well perfused, normal skin turgor and color. "   Neurologic: Cranial nerves II-XII grossly intact.   Psychiatric: Mood calm, pressured speech    Assessment/Plan   [facility], please contact me if you have not heard from our referral department by Friday.    We agreed to not start medication at this time but to follow  you clinically very closely and are aware that we may need to trial different medication if we do not quickly stabilize.

## 2024-03-14 ENCOUNTER — APPOINTMENT (OUTPATIENT)
Dept: PRIMARY CARE | Facility: CLINIC | Age: 49
End: 2024-03-14
Payer: COMMERCIAL

## 2024-04-11 ENCOUNTER — TELEPHONE (OUTPATIENT)
Dept: PRIMARY CARE | Facility: CLINIC | Age: 49
End: 2024-04-11
Payer: COMMERCIAL

## 2024-04-11 DIAGNOSIS — F41.9 ANXIETY: ICD-10-CM

## 2024-04-11 DIAGNOSIS — F31.9 BIPOLAR DEPRESSION (MULTI): Primary | ICD-10-CM

## 2024-04-11 NOTE — TELEPHONE ENCOUNTER
"Patient called in after hours this evening with \"needs psych eval asap\"  Reviewed chart - he was in ER on 4/8/24 for alcohol intoxication, suicidal and homicidal ideation.  Urine tox was positve for EtOH, cannabinoid, and oxycodone.   He was transferred to [facility] in [place] from ER and discharged from there today.  He was started on Seroquel 25 mg daily and has psychiatric follow up currently scheduled with [facility] next Wednesday 4/17.  He states he does NOT want to go there and is requesting STAT psychiatric appt with  within next few days.   He states he has seen 25+ psychiatrists over the years and has bipolar, personality disorder, depression, anxiety, etc.   He tells me that he went into a fit of rage / manic depression this weekend.  He denies any current suicidal or homicidal ideation.  Last drink was Monday morning.  He sees surgeon tomorrow for broken arm.  He currently feels stable.  He is aware to return to ER if moods worsen and he develops any thoughts of hurting self or others.  He agrees.  I advised I am not sure we will be able to arrange sooner follow up and he knows he may still need to keep appt with [facility] on Wednesday.  I told him I would send note to PCP for further guidance.  I did place a stat psychiatry referral.  It looks like referral to [facility] has already been placed 3/13/24.    "

## 2024-04-12 NOTE — TELEPHONE ENCOUNTER
Please call patient today and offer him an appointment early next week if we have openings. I apologize but I can not fit him in today. He should proceed back to the ED if he continues to feel manic.

## 2024-04-15 DIAGNOSIS — F31.10 BIPOLAR I DISORDER WITH MANIA (MULTI): Primary | ICD-10-CM

## 2024-04-15 RX ORDER — QUETIAPINE FUMARATE 25 MG/1
25 TABLET, FILM COATED ORAL 3 TIMES DAILY
Qty: 90 TABLET | Refills: 0 | Status: SHIPPED | OUTPATIENT
Start: 2024-04-15 | End: 2024-05-15

## 2024-04-19 DIAGNOSIS — N52.9 ERECTILE DYSFUNCTION, UNSPECIFIED ERECTILE DYSFUNCTION TYPE: ICD-10-CM

## 2024-04-19 RX ORDER — TADALAFIL 20 MG/1
20 TABLET ORAL AS NEEDED
Qty: 10 TABLET | Refills: 5 | Status: SHIPPED | OUTPATIENT
Start: 2024-04-19 | End: 2024-05-19

## 2024-06-14 ENCOUNTER — APPOINTMENT (OUTPATIENT)
Dept: HEMATOLOGY/ONCOLOGY | Facility: CLINIC | Age: 49
End: 2024-06-14
Payer: COMMERCIAL

## 2024-06-17 ENCOUNTER — APPOINTMENT (OUTPATIENT)
Dept: PRIMARY CARE | Facility: CLINIC | Age: 49
End: 2024-06-17
Payer: COMMERCIAL

## 2024-06-17 DIAGNOSIS — N52.9 ERECTILE DYSFUNCTION, UNSPECIFIED ERECTILE DYSFUNCTION TYPE: ICD-10-CM

## 2024-06-17 DIAGNOSIS — F41.1 GAD (GENERALIZED ANXIETY DISORDER): ICD-10-CM

## 2024-06-17 DIAGNOSIS — Z09 HOSPITAL DISCHARGE FOLLOW-UP: Primary | ICD-10-CM

## 2024-06-17 DIAGNOSIS — F10.21 RECOVERING ALCOHOLIC IN REMISSION (MULTI): ICD-10-CM

## 2024-06-17 DIAGNOSIS — K64.8 INTERNAL HEMORRHOIDS WITH COMPLICATION: ICD-10-CM

## 2024-06-17 DIAGNOSIS — F31.10 BIPOLAR I DISORDER WITH MANIA (MULTI): ICD-10-CM

## 2024-06-17 PROCEDURE — 1036F TOBACCO NON-USER: CPT | Performed by: FAMILY MEDICINE

## 2024-06-17 PROCEDURE — 3008F BODY MASS INDEX DOCD: CPT | Performed by: FAMILY MEDICINE

## 2024-06-17 PROCEDURE — 99214 OFFICE O/P EST MOD 30 MIN: CPT | Performed by: FAMILY MEDICINE

## 2024-06-17 RX ORDER — TADALAFIL 20 MG/1
20 TABLET ORAL AS NEEDED
Qty: 10 TABLET | Refills: 5 | Status: SHIPPED | OUTPATIENT
Start: 2024-06-17 | End: 2024-07-17

## 2024-06-17 RX ORDER — QUETIAPINE FUMARATE 100 MG/1
100 TABLET, FILM COATED ORAL NIGHTLY
Start: 2024-06-17 | End: 2024-07-17

## 2024-06-17 RX ORDER — LITHIUM CARBONATE 600 MG/1
600 CAPSULE ORAL
Start: 2024-06-17 | End: 2024-09-15

## 2024-06-17 ASSESSMENT — ENCOUNTER SYMPTOMS
DYSPHORIC MOOD: 0
SLEEP DISTURBANCE: 0
NERVOUS/ANXIOUS: 1
FATIGUE: 1
AGITATION: 0

## 2024-06-17 NOTE — PROGRESS NOTES
Subjective   Patient ID: Jason Ceballos is a 49 y.o. male who presents for a follow up from rehab.   I have communicated my name and active licensure. The patient's identity and physical location were verified at the time of this visit. Either the patient or their legal representative has been informed of the risks and benefits of, and alternatives to, treatment through a remote evaluation and consents to proceed with the evaluation remotely.     HPI   Rehab follow up ([place])-alcohol detox and bipolar with manic episode   Had detoxed from alcohol at rehab. +THC on drug screen   Going back to work on Thursday, feels anxious  He is using alesia nicotine patches, makes him feel calm  He is going to NA meeting daily, he has a sponsor  He is setting up therapy counseling through [facility] this week   He is using the Bible and going to Sikh as self therapy    [facility]-Valerie Rose NP (psych)  Has been home for 1 week   Has taken the med combination for 1 month, he feels very well maintained on meds, denies adverse effects   He is not sure if he has a BP cuff    July 1st-[facility] supplementation     HTN  Amlodipine 5mg at bedtime + losartan 50mg at bedtime +metoprolol 50mg ER every day  Was drinking 6 coffees and using nicotine prior to getting BP checked, patient is not convinced he has elevated BP  Afternoon BPs-130/85  Has not been checking Bps at home, has not been having symptoms of high or low BP. Denies dizziness or lightheadedness.   TSH normal 4/19  Reports feeling groggy, napping more than typical     Internal Hemorrhoids  Has 78 internal suppositories (hydrocortisone with lidocaine) from rehab-has not needed them  X chronic  Not bleeding and pain is now intermittent     Review of Systems   Constitutional:  Positive for fatigue.   Psychiatric/Behavioral:  Negative for agitation, dysphoric mood and sleep disturbance. The patient is nervous/anxious.    All other systems reviewed and  are negative.      Objective   There were no vitals taken for this visit.    Physical Exam  Constitutional: Well developed, well nourished, alert and in no acute distress   Eyes: Normal external exam.   Pulmonary: No respiratory distress  Neurologic: Cranial nerves II-XII grossly intact.   Psychiatric: Mood calm and affect normal.      Assessment/Plan   HTN  STOP metoprolol 50mg   Hypertension (High Blood Pressure)  -continue current medications  -follow a low sodium diet  -limit stress, alcohol, tobacco and caffeine as much as possible    It is important to control Blood Pressure for reducing risk of stroke, heart attack, kidney damage, and circulatory problems from clogged arteries.  Advised new blood pressure goals based on evidence from recent studies showed blood pressure should be less than 130/80.   Check your BP at least weekly and If your BP is above this goal on repeated measurements, please contact us so we can make treatment adjustments.  Diet recommendations - reduce sodium intake (less than 2,400 mg/day), follow DASH diet, increase exercise (150 minutes per week), lose weight (Goal BMI < 25).   Generally recommend follow up at least every 6 months.    If you are less than 60 years old, have diabetes mellitus, or chronic kidney disease, your goal blood pressure is < 140/90.  If you are older than 60 years old and do not have diabetes or kidney disease, your goal blood pressure is < 150/90.      Rehab records reviewed     Continue with Psych and setting up therapy, continue current medications as directed    Keep appointment in October for CPE

## 2024-06-30 NOTE — PROGRESS NOTES
"Endocrinology  7/1/2024    History of Present Illness   Jason Ceballos is a 49 y.o. year old male with medical history HTN, bipolar disorder, alcohol use disorder, testosterone use, here for testosterone therapy.    He started on testosterone at age 35, through [facility].     Has been seen by urology for elevated PSA and supratherapeutic testosterone levels.   Last seen by urology 2/2023. At that time testosterone level had decreased from 3,159 to 184. He was restarted on testosterone 200 mg IM every 2 weeks.     After restarting testosterone therapy, his levels were checked 9/2023 and his testosterone level was >3000. Regarding those abnormal labs, through a message with his PCP at the time, he stated he \"started getting depressed recently over feeling so lousy and I got the bright idea to add in an extra shot of trt with some old deca I have - in an attempt to simply feel better. \" Today he tells me he did this right before entering rehab. He entered rehab in 4/2024.     He reports to \"stock piling\" testosterone after he was hired by [employer].     Has also had erythrocytosis and has been evaluated by hematology and oncology. They recommended therapeutic phlebotomy every 2 weeks to maintain Hgb < 15 and to decrease testosterone as much as possible. They determined that his erythrocytosis was most likely related to testosterone use. 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     Most recently he was treated in [place] for drug and alcohol rehab. He states that physicians there restarted him on IM testosterone 200 mg every 2 weeks, which he remains on now. Reportedly started this regimen in April 2024.   He last took testosterone yesterday (6/30/2024)  He is also taking supplements - DHEA and diindolylmethane.    Today he is feeling good.   He has been home from rehab for about 3 weeks. He was also treated at this " rehab facility in 2021.   No opioid use.     Lives with wife and son. Has 1 biological child. He was 35 when his son was born. He was started on testosterone the same year. Libido is good. He is able to get an erection.     Denies snoring. No diagnosis of PARESH.   No history of VTE, CVA, MI.     Review of Systems   General: Denies fever or chills   Head: Denies headache or vision changes   Neck: Denies tenderness or lumps   Cardiac: Denies chest pain   Respiratory: Denies shortness of breath or cough   GI: Denies abdominal pain, nausea, or vomiting   Extremities: Denies swelling   Skin: Denies rash     Medications     Current Outpatient Medications   Medication Instructions    cholecalciferol (Vitamin D-3) 25 MCG (1000 UT) tablet 2 tablets, oral, Daily    lithium 600 mg, oral, 2 times daily (morning and late afternoon)    multivitamin (Multiple Vitamins) tablet 1 tablet, oral, Daily    QUEtiapine (SEROQUEL) 100 mg, oral, Nightly    tadalafil (CIALIS) 20 mg, oral, As needed, Take 1 hour before activity    testosterone cypionate (DEPO-TESTOSTERONE) 200 mg, intramuscular, Every 14 days          History     Past Medical History:   Diagnosis Date    Acute upper respiratory infection, unspecified 09/16/2022    Acute upper respiratory infection    Change in bowel habit 01/26/2022    Frequent bowel movements    Contusion of right foot, initial encounter 04/29/2019    Contusion of right foot, initial encounter    Impacted cerumen, bilateral 09/03/2019    Bilateral impacted cerumen    Other malaise 02/03/2015    Malaise and fatigue    Other malaise 10/04/2022    Malaise and fatigue    Other shoulder lesions, unspecified shoulder 08/08/2019    Rotator cuff tendinitis    Other specified health status     No pertinent past medical history    Pain in left shoulder 01/31/2022    Left shoulder pain    Pain in unspecified foot 10/30/2019    Foot pain    Personal history of other diseases of the circulatory system 05/18/2022    History  "of abnormal electrocardiography    Personal history of other diseases of the respiratory system 09/16/2022    History of acute sinusitis    Personal history of other diseases of the respiratory system 08/08/2019    History of allergic rhinitis    Personal history of other specified conditions 01/26/2022    History of elevated prostate specific antigen (PSA)    Personal history of other specified conditions 02/26/2022    History of diarrhea    Strain of muscle and tendon of front wall of thorax, initial encounter 01/31/2022    Strain of left pectoralis muscle, initial encounter    Unspecified injury of unspecified foot, initial encounter 09/05/2019    Foot injury       Past Surgical History:   Procedure Laterality Date    HAND SURGERY  08/11/2014    Hand Surgery                                                                                                                                                          KNEE SURGERY  08/11/2014    Knee Surgery    OTHER SURGICAL HISTORY  08/11/2014    Repair Of Pectus Carinatum    OTHER SURGICAL HISTORY  08/11/2014    Ulnar Osteotomy       Family History   Problem Relation Name Age of Onset    Breast cancer Mother Jessenia     Congenital heart disease Father      Depression Sister name     Depression Brother name     Schizophrenia Brother name         Allergies   Allergen Reactions    Codeine Other    Opioids - Morphine Analogues Other    Prednisone Unknown and Cough     Sleep apnea with injectable    Phenobarbital Rash        Social history:   - Sober from alcohol 69 days   - Denies tobacco use   - Denies any other recreational drug use, prior MJ use   - 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 Son is 12.       Physical Exam   /81 (BP Location: Left arm, Patient Position: Sitting)   Pulse 66   Temp 35.9 °C (96.6 °F)   Resp 16   Ht 1.778 m (5' 10\")   Wt 91.6 kg (202 lb)   BMI 28.98 kg/m²   General: Well appearing, no acute " distress  Heart: Normal rate  Neck: No visible goiter   Lungs: Breathing comfortably on room air   Skin: No rashes      Labs and Imaging      Testosterone, Total, LC-MS/MS Testosterone, Free Testosterone   Latest Ref Rng 250 - 1,100 ng/dL 35.0 - 155.0 pg/mL 240 - 1000 ng/dL   10/4/2022 3,159 (H)  1,134.1 (H)     1/12/2023 2,770 (H)  944.6 (H)     2/13/2023   184 (L)    9/6/2023   >3000 (H)         Hemochromatosis Mutation Analysis: [id number]  Order: [id number]   Collected 12/14/2023 09:11       Status: Final result       Visible to patient: Yes (seen)       Dx: Polycythemia    0 Result Notes      Component  Ref Range & Units    Hemochromatosis H63D Result  Normal Normal   Hemochromatosis C282Y Result  Normal Heterozygous Abnormal    Hemochromatosis Interpretation    INTERPRETATION  C282Y HETEROZYGOUS indicates heterozygous c.845G>A (p.C282Y) mutation in the HFE gene was detected, however, the c.187C>G (p.H63D) mutation was NOT detected. These results neither confirm nor rule out the diagnosis of hereditary hemochromatosis in this individual. The HFE C282Y mutation, either in the homozygous state or in combination with the HFE H63D mutation, accounts for more than 90% of the hereditary hemochromatosis patients studied. However, ten percent of healthy Caucasians of Northern  background are unaffected carriers of this mutation, and the relative risk for hemochromatosis among HFE C282Y heterozygotes has not been demonstrated to differ from that of those with no mutation in HFE. For those with a personal history of definite or possible hereditary hemochromatosis, additional evaluations to identify other causes of disease should be considered for future medical management. Further genetic testing is recommended for any first-degree relative of this individual who has been diagnosed with hereditary hemochromatosis, to determine the presence of an additional mutated HFE allele in the family.      METHODOLOGY  DNA was extracted from the specimen provided and analyzed using allele-specific TaqMan MGB probes following PCR.      DISCLAIMER  This laboratory developed test was developed and its analytical performance characteristics have been determined by [facility]. This test has not been cleared or approved by the FDA; however, the FDA has determined that such approval is not necessary. The [facility] is CAP accredited and certified under the Clinical Laboratory Improvement Amendments of 1988 (CLIA-88) as qualified to perform high complexity testing.   Electronically signed and reported by Elizabeth Geiger MD PhD [facility]   Resulting Agency [facility]        Assessment and Plan   Jason Ceballos is a 49 y.o. year old male with medical history HTN, bipolar disorder, alcohol use disorder, testosterone use, here for testosterone therapy. He was started on this age 35, the same year he had a son. The fact he was able to conceive points away from true hypogonadism. Regardless, he has been on exogenous testosterone for so long that his HPT axis is likely very suppressed. He has a history of testosterone misuse, as evidenced by his levels of > 3000 on multiple occasions. He has had erythrocytosis from this.     He states he is taking 200 mg of IM testosterone every 2 weeks, with his last injection being yesterday (6/30/2024). I instructed him to get labs done in 1 week (7/8/2024). He asked to checked them in 3 weeks, however that would not be mid-cycle, so I reiterated he needs to get his labs checked on 7/8/2024.     OARRS reviewed. Last testosterone Rx filled 6/11/2024, 28 days, from physician in [place]. Prior to that, 3/2023 from Urology.      # Testosterone use:   - Check testosterone 7/8/2024   - Check CBC, PSA, CMP     RTC: Pending labs      Josefa Stout, DO   Endocrinology

## 2024-07-01 ENCOUNTER — APPOINTMENT (OUTPATIENT)
Dept: ENDOCRINOLOGY | Facility: CLINIC | Age: 49
End: 2024-07-01
Payer: COMMERCIAL

## 2024-07-01 VITALS
HEIGHT: 70 IN | RESPIRATION RATE: 16 BRPM | SYSTOLIC BLOOD PRESSURE: 136 MMHG | DIASTOLIC BLOOD PRESSURE: 81 MMHG | WEIGHT: 202 LBS | BODY MASS INDEX: 28.92 KG/M2 | HEART RATE: 66 BPM | TEMPERATURE: 96.6 F

## 2024-07-01 DIAGNOSIS — R79.89 LOW TESTOSTERONE: Primary | ICD-10-CM

## 2024-07-01 PROCEDURE — 1036F TOBACCO NON-USER: CPT | Performed by: STUDENT IN AN ORGANIZED HEALTH CARE EDUCATION/TRAINING PROGRAM

## 2024-07-01 PROCEDURE — 99204 OFFICE O/P NEW MOD 45 MIN: CPT | Performed by: STUDENT IN AN ORGANIZED HEALTH CARE EDUCATION/TRAINING PROGRAM

## 2024-07-01 PROCEDURE — 3008F BODY MASS INDEX DOCD: CPT | Performed by: STUDENT IN AN ORGANIZED HEALTH CARE EDUCATION/TRAINING PROGRAM

## 2024-07-01 RX ORDER — HYDROCORTISONE ACETATE 25 MG/1
SUPPOSITORY RECTAL
COMMUNITY
Start: 2024-06-10

## 2024-07-01 RX ORDER — ACETAMINOPHEN 500 MG
TABLET ORAL
COMMUNITY
Start: 2024-05-26

## 2024-07-01 RX ORDER — METOPROLOL SUCCINATE 50 MG/1
1 TABLET, EXTENDED RELEASE ORAL
COMMUNITY
Start: 2024-06-10

## 2024-07-01 RX ORDER — LOSARTAN POTASSIUM 50 MG/1
50 TABLET ORAL NIGHTLY
COMMUNITY
Start: 2024-06-10

## 2024-07-01 RX ORDER — HYDROCORTISONE 25 MG/G
CREAM TOPICAL
COMMUNITY
Start: 2024-06-07

## 2024-07-01 RX ORDER — AMLODIPINE BESYLATE 5 MG/1
5 TABLET ORAL NIGHTLY
COMMUNITY
Start: 2024-06-10

## 2024-07-01 RX ORDER — PROPRANOLOL HYDROCHLORIDE 20 MG/1
1 TABLET ORAL
COMMUNITY
Start: 2024-06-10

## 2024-07-01 RX ORDER — HYDROXYZINE HYDROCHLORIDE 25 MG/1
25 TABLET, FILM COATED ORAL DAILY PRN
COMMUNITY
Start: 2024-04-22

## 2024-07-01 NOTE — PATIENT INSTRUCTIONS
- Get labs 7/8/2024 fasting, at 8:00 AM   - Please stop taking DHEA and DIM   - I will let you know the results

## 2024-07-02 DIAGNOSIS — Z79.899 OTHER LONG TERM (CURRENT) DRUG THERAPY: Primary | ICD-10-CM

## 2024-07-02 DIAGNOSIS — D75.1 POLYCYTHEMIA: ICD-10-CM

## 2024-07-05 DIAGNOSIS — D75.1 POLYCYTHEMIA: ICD-10-CM

## 2024-07-08 ENCOUNTER — LAB (OUTPATIENT)
Dept: LAB | Facility: CLINIC | Age: 49
End: 2024-07-08
Payer: COMMERCIAL

## 2024-07-08 DIAGNOSIS — R79.89 LOW TESTOSTERONE: ICD-10-CM

## 2024-07-08 DIAGNOSIS — Z79.899 OTHER LONG TERM (CURRENT) DRUG THERAPY: ICD-10-CM

## 2024-07-08 DIAGNOSIS — D75.1 POLYCYTHEMIA: ICD-10-CM

## 2024-07-08 LAB
ERYTHROCYTE [DISTWIDTH] IN BLOOD BY AUTOMATED COUNT: 13.4 % (ref 11.5–14.5)
HCT VFR BLD AUTO: 56.2 % (ref 41–52)
HGB BLD-MCNC: 17.9 G/DL (ref 13.5–17.5)
MCH RBC QN AUTO: 31.7 PG (ref 26–34)
MCHC RBC AUTO-ENTMCNC: 31.9 G/DL (ref 32–36)
MCV RBC AUTO: 100 FL (ref 80–100)
NRBC BLD-RTO: ABNORMAL /100{WBCS}
PLATELET # BLD AUTO: 289 X10*3/UL (ref 150–450)
RBC # BLD AUTO: 5.65 X10*6/UL (ref 4.5–5.9)
WBC # BLD AUTO: 8.7 X10*3/UL (ref 4.4–11.3)

## 2024-07-08 PROCEDURE — 81450 HL NEO GSAP 5-50DNA/DNA&RNA: CPT

## 2024-07-08 PROCEDURE — 36415 COLL VENOUS BLD VENIPUNCTURE: CPT

## 2024-07-08 PROCEDURE — 80178 ASSAY OF LITHIUM: CPT

## 2024-07-08 PROCEDURE — 84153 ASSAY OF PSA TOTAL: CPT

## 2024-07-08 PROCEDURE — 84402 ASSAY OF FREE TESTOSTERONE: CPT

## 2024-07-08 PROCEDURE — 80053 COMPREHEN METABOLIC PANEL: CPT

## 2024-07-08 PROCEDURE — 85027 COMPLETE CBC AUTOMATED: CPT

## 2024-07-08 PROCEDURE — 83540 ASSAY OF IRON: CPT

## 2024-07-08 PROCEDURE — G0452 MOLECULAR PATHOLOGY INTERPR: HCPCS | Performed by: PATHOLOGY

## 2024-07-09 LAB
ALBUMIN SERPL BCP-MCNC: 5 G/DL (ref 3.4–5)
ALP SERPL-CCNC: 37 U/L (ref 33–120)
ALT SERPL W P-5'-P-CCNC: 38 U/L (ref 10–52)
ANION GAP SERPL CALC-SCNC: 16 MMOL/L (ref 10–20)
AST SERPL W P-5'-P-CCNC: 31 U/L (ref 9–39)
BILIRUB SERPL-MCNC: 0.4 MG/DL (ref 0–1.2)
BUN SERPL-MCNC: 22 MG/DL (ref 6–23)
CALCIUM SERPL-MCNC: 11 MG/DL (ref 8.6–10.6)
CHLORIDE SERPL-SCNC: 102 MMOL/L (ref 98–107)
CO2 SERPL-SCNC: 27 MMOL/L (ref 21–32)
CREAT SERPL-MCNC: 1.44 MG/DL (ref 0.5–1.3)
EGFRCR SERPLBLD CKD-EPI 2021: 60 ML/MIN/1.73M*2
GLUCOSE SERPL-MCNC: 97 MG/DL (ref 74–99)
IRON SATN MFR SERPL: 28 % (ref 25–45)
IRON SERPL-MCNC: 109 UG/DL (ref 35–150)
LITHIUM SERPL-SCNC: 0.87 MMOL/L (ref 0.6–1.2)
POTASSIUM SERPL-SCNC: 5.2 MMOL/L (ref 3.5–5.3)
PROT SERPL-MCNC: 7.7 G/DL (ref 6.4–8.2)
PSA SERPL-MCNC: 1.34 NG/ML
SODIUM SERPL-SCNC: 140 MMOL/L (ref 136–145)
TIBC SERPL-MCNC: 384 UG/DL (ref 240–445)
UIBC SERPL-MCNC: 275 UG/DL (ref 110–370)

## 2024-07-12 LAB
ELECTRONICALLY SIGNED BY: NORMAL
MYELOID NGS RESULTS: NORMAL

## 2024-07-16 ENCOUNTER — TELEPHONE (OUTPATIENT)
Dept: ENDOCRINOLOGY | Facility: CLINIC | Age: 49
End: 2024-07-16
Payer: COMMERCIAL

## 2024-07-16 NOTE — TELEPHONE ENCOUNTER
TRACY received from Faride[facility] at  Lab. Lab for Testosterone Free and Total was cancelled for QNS. If you have questions you can call [phone] or  [phone] opt 1

## 2024-07-17 DIAGNOSIS — R79.89 LOW TESTOSTERONE: Primary | ICD-10-CM

## 2024-07-18 ENCOUNTER — APPOINTMENT (OUTPATIENT)
Dept: HEMATOLOGY/ONCOLOGY | Facility: CLINIC | Age: 49
End: 2024-07-18
Payer: COMMERCIAL

## 2024-07-31 DIAGNOSIS — I10 BENIGN ESSENTIAL HYPERTENSION: Primary | ICD-10-CM

## 2024-07-31 RX ORDER — LOSARTAN POTASSIUM 50 MG/1
50 TABLET ORAL NIGHTLY
Qty: 90 TABLET | Refills: 1 | Status: SHIPPED | OUTPATIENT
Start: 2024-07-31

## 2024-07-31 RX ORDER — AMLODIPINE BESYLATE 5 MG/1
5 TABLET ORAL NIGHTLY
Qty: 90 TABLET | Refills: 1 | Status: SHIPPED | OUTPATIENT
Start: 2024-07-31

## 2024-08-02 ENCOUNTER — OFFICE VISIT (OUTPATIENT)
Dept: HEMATOLOGY/ONCOLOGY | Facility: CLINIC | Age: 49
End: 2024-08-02
Payer: COMMERCIAL

## 2024-08-02 ENCOUNTER — INFUSION (OUTPATIENT)
Dept: HEMATOLOGY/ONCOLOGY | Facility: CLINIC | Age: 49
End: 2024-08-02
Payer: COMMERCIAL

## 2024-08-02 VITALS
HEART RATE: 88 BPM | TEMPERATURE: 97.5 F | SYSTOLIC BLOOD PRESSURE: 145 MMHG | OXYGEN SATURATION: 97 % | RESPIRATION RATE: 16 BRPM | DIASTOLIC BLOOD PRESSURE: 84 MMHG | WEIGHT: 201.94 LBS | BODY MASS INDEX: 28.98 KG/M2

## 2024-08-02 VITALS — TEMPERATURE: 96.8 F

## 2024-08-02 DIAGNOSIS — D75.1 POLYCYTHEMIA: ICD-10-CM

## 2024-08-02 LAB
BASOPHILS # BLD AUTO: 0.03 X10*3/UL (ref 0–0.1)
BASOPHILS NFR BLD AUTO: 0.4 %
EOSINOPHIL # BLD AUTO: 0.23 X10*3/UL (ref 0–0.7)
EOSINOPHIL NFR BLD AUTO: 3.3 %
ERYTHROCYTE [DISTWIDTH] IN BLOOD BY AUTOMATED COUNT: 12.3 % (ref 11.5–14.5)
HCT VFR BLD AUTO: 52.8 % (ref 41–52)
HGB BLD-MCNC: 17.7 G/DL (ref 13.5–17.5)
IMM GRANULOCYTES # BLD AUTO: 0.03 X10*3/UL (ref 0–0.7)
IMM GRANULOCYTES NFR BLD AUTO: 0.4 % (ref 0–0.9)
LYMPHOCYTES # BLD AUTO: 2.05 X10*3/UL (ref 1.2–4.8)
LYMPHOCYTES NFR BLD AUTO: 29.2 %
MCH RBC QN AUTO: 31.2 PG (ref 26–34)
MCHC RBC AUTO-ENTMCNC: 33.5 G/DL (ref 32–36)
MCV RBC AUTO: 93 FL (ref 80–100)
MONOCYTES # BLD AUTO: 0.67 X10*3/UL (ref 0.1–1)
MONOCYTES NFR BLD AUTO: 9.6 %
NEUTROPHILS # BLD AUTO: 4 X10*3/UL (ref 1.2–7.7)
NEUTROPHILS NFR BLD AUTO: 57.1 %
NRBC BLD-RTO: ABNORMAL /100{WBCS}
PLATELET # BLD AUTO: 285 X10*3/UL (ref 150–450)
RBC # BLD AUTO: 5.68 X10*6/UL (ref 4.5–5.9)
WBC # BLD AUTO: 7 X10*3/UL (ref 4.4–11.3)

## 2024-08-02 PROCEDURE — 85025 COMPLETE CBC W/AUTO DIFF WBC: CPT

## 2024-08-02 PROCEDURE — 99195 PHLEBOTOMY: CPT

## 2024-08-02 PROCEDURE — 80053 COMPREHEN METABOLIC PANEL: CPT

## 2024-08-02 PROCEDURE — 99214 OFFICE O/P EST MOD 30 MIN: CPT | Performed by: INTERNAL MEDICINE

## 2024-08-02 RX ORDER — ALBUTEROL SULFATE 0.83 MG/ML
3 SOLUTION RESPIRATORY (INHALATION) AS NEEDED
Status: DISCONTINUED | OUTPATIENT
Start: 2024-08-02 | End: 2024-08-02 | Stop reason: HOSPADM

## 2024-08-02 RX ORDER — EPINEPHRINE 0.3 MG/.3ML
0.3 INJECTION SUBCUTANEOUS EVERY 5 MIN PRN
Status: DISCONTINUED | OUTPATIENT
Start: 2024-08-02 | End: 2024-08-02 | Stop reason: HOSPADM

## 2024-08-02 RX ORDER — ALBUTEROL SULFATE 0.83 MG/ML
3 SOLUTION RESPIRATORY (INHALATION) AS NEEDED
OUTPATIENT
Start: 2024-08-02

## 2024-08-02 RX ORDER — FAMOTIDINE 10 MG/ML
20 INJECTION INTRAVENOUS ONCE AS NEEDED
Status: DISCONTINUED | OUTPATIENT
Start: 2024-08-02 | End: 2024-08-02 | Stop reason: HOSPADM

## 2024-08-02 RX ORDER — FAMOTIDINE 10 MG/ML
20 INJECTION INTRAVENOUS ONCE AS NEEDED
OUTPATIENT
Start: 2024-08-02

## 2024-08-02 RX ORDER — DIPHENHYDRAMINE HYDROCHLORIDE 50 MG/ML
50 INJECTION INTRAMUSCULAR; INTRAVENOUS AS NEEDED
Status: DISCONTINUED | OUTPATIENT
Start: 2024-08-02 | End: 2024-08-02 | Stop reason: HOSPADM

## 2024-08-02 RX ORDER — EPINEPHRINE 0.3 MG/.3ML
0.3 INJECTION SUBCUTANEOUS EVERY 5 MIN PRN
OUTPATIENT
Start: 2024-08-02

## 2024-08-02 RX ORDER — DIPHENHYDRAMINE HYDROCHLORIDE 50 MG/ML
50 INJECTION INTRAMUSCULAR; INTRAVENOUS AS NEEDED
OUTPATIENT
Start: 2024-08-02

## 2024-08-02 ASSESSMENT — PAIN SCALES - GENERAL: PAINLEVEL: 0-NO PAIN

## 2024-08-02 NOTE — PROGRESS NOTES
Patient requests to have phlebs done here.  Phlebs every 4 weeks per dr catherine with follow up in 3 months.  Patient aware and agreeable.  To infusion for phleb.  Patient independently ambulatory off unit in NAD and without complaints.  Gait steady.  Call back instructions reviewed.  Patient verbalized understanding.

## 2024-08-02 NOTE — PROGRESS NOTES
KOMAL ARCEO is a 48 year old Male referred for evaluation of elevated hemoglobin.  MPN studies were negative.  Erythrocytosis most likely related to testosterone use.        Interval History:    History of Present Illness:     A 48-year -old without significant past medical history, currently receiving testosterone therapy was referred for evaluation of elevated hemoglobin, hematocrit.  The patient is asymptomatic.     Denied history of weight loss, fevers, night sweats, chest pain , sob, nausea, vomitting, diarrhea, hematemesis,melena.    The patient had come for follow-up on 2024 regarding history of testosterone related erythrocytosis MPN studies were negative.     Past Medical History:     Elevated hemoglobin/hematocrit     Past Surgical History:     Knee surgery torn tendon repairulner nerve reposition both arms     Colonoscopy:          Family History:     Mother had bilateral mastectomies     Biological father congenital heart disease and  from it in his 60s.     Personal ans Social History:     48-years -old, , has 11 year old son,.  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  No ho smoking, alcohol or drug abuse.           Review of Systems:   ·  System Review All other systems have been reviewed and are negative for complaint.            Allergies and Intolerances:       Intolerances:         predniSONE: Drug, Unknown, Active     Outpatient Medication Profile:  * Outpatient Medication Status not yet specified          Surg History:         History of knee surgery: ICD-10: Z98.890, Status: Active         History of hand surgery: ICD-10: Z98.890, Status: Active         History of shoulder surgery: ICD-10: Z98.890, Status: Active         History of surgery on arm: ICD-10: Z98.890, Status: Active     Family History: No Family History items are recorded  in the problem list.      Social History:   Social Substance History:  ·  Social History denies smoking, alcohol and drug use   ·  Smoking Status  never smoker   ·  Tobacco Use denies   ·  Alcohol Use denies   ·  Drug Use denies            Vitals and Measurements:   Vitals: Temp: 37.1  HR: 78  RR: NA  BP: 131/90  SPO2%:   78   Measurements: HT(cm): 178.7  WT(kg): 91.3  BSA: 2.12   BMI:  28.5         Lab Results:           Assessment and Plan:   MPN studies were negative.  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        A 48-year -old without significant past medical history, currently receiving testosterone therapy was referred for evaluation of elevated hemoglobin, hematocrit.  The patient is asymptomatic.     Had a detailed discussion with the patient and explained about significance and manifestation with elevated hemoglobin. differential diagnosis includes myeloproliferative syndrome, secondary cuases such as drug induces, chronic hypoxia vs others.  .  Suggest  Patient requesting to donate blood at the [facility]/blood bank  Decrease Testosterone dose as much as possible.  Dietary consult.  The patient had come for follow-up on August 2, 2024 regarding history of testosterone related erythrocytosis MPN studies were negative.  The patient is doing reasonably well but did not go for blood donation.  A CBC on July 8, 2024 revealed WBC 8.7, hemoglobin 17.9 g/dL, hematocrit 56.2, platelets 289,000/mm³.  Recommend therapeutic phlebotomies every 4 weeks to maintain hemoglobin at 15 g/dL.  Return in 3 months.

## 2024-08-02 NOTE — PROGRESS NOTES
Piv - placed - phlebotomy today 500ml whole blood     Tolerated phlebotomy - has next fuv scheduled for phleb as planned

## 2024-08-03 LAB
ALBUMIN SERPL BCP-MCNC: 4.9 G/DL (ref 3.4–5)
ALP SERPL-CCNC: 26 U/L (ref 33–120)
ALT SERPL W P-5'-P-CCNC: 34 U/L (ref 10–52)
ANION GAP SERPL CALC-SCNC: 14 MMOL/L (ref 10–20)
AST SERPL W P-5'-P-CCNC: 21 U/L (ref 9–39)
BILIRUB SERPL-MCNC: 0.5 MG/DL (ref 0–1.2)
BUN SERPL-MCNC: 19 MG/DL (ref 6–23)
CALCIUM SERPL-MCNC: 12 MG/DL (ref 8.6–10.6)
CHLORIDE SERPL-SCNC: 102 MMOL/L (ref 98–107)
CO2 SERPL-SCNC: 26 MMOL/L (ref 21–32)
CREAT SERPL-MCNC: 1.66 MG/DL (ref 0.5–1.3)
EGFRCR SERPLBLD CKD-EPI 2021: 50 ML/MIN/1.73M*2
GLUCOSE SERPL-MCNC: 96 MG/DL (ref 74–99)
POTASSIUM SERPL-SCNC: 4.6 MMOL/L (ref 3.5–5.3)
PROT SERPL-MCNC: 7.5 G/DL (ref 6.4–8.2)
SODIUM SERPL-SCNC: 137 MMOL/L (ref 136–145)

## 2024-08-29 ENCOUNTER — OFFICE VISIT (OUTPATIENT)
Dept: PRIMARY CARE | Facility: CLINIC | Age: 49
End: 2024-08-29
Payer: COMMERCIAL

## 2024-08-29 VITALS
BODY MASS INDEX: 28.3 KG/M2 | SYSTOLIC BLOOD PRESSURE: 145 MMHG | DIASTOLIC BLOOD PRESSURE: 88 MMHG | TEMPERATURE: 99.4 F | RESPIRATION RATE: 16 BRPM | WEIGHT: 197.2 LBS | HEART RATE: 89 BPM | OXYGEN SATURATION: 97 %

## 2024-08-29 DIAGNOSIS — F31.9 BIPOLAR DEPRESSION (MULTI): ICD-10-CM

## 2024-08-29 DIAGNOSIS — F30.10: Primary | Chronic | ICD-10-CM

## 2024-08-29 DIAGNOSIS — Z79.899 MEDICATION MANAGEMENT: ICD-10-CM

## 2024-08-29 PROCEDURE — 1036F TOBACCO NON-USER: CPT | Performed by: FAMILY MEDICINE

## 2024-08-29 PROCEDURE — 99214 OFFICE O/P EST MOD 30 MIN: CPT | Performed by: FAMILY MEDICINE

## 2024-08-29 ASSESSMENT — ENCOUNTER SYMPTOMS
CONFUSION: 0
MYALGIAS: 1
HYPERACTIVE: 0
FATIGUE: 1

## 2024-08-29 NOTE — PROGRESS NOTES
Subjective   Patient ID: Jason Ceballos is a 49 y.o. male who presents for manic episode.    HPI   PHQ-9: 6  YVES-7: 6  Managed by Psych NP, patient has not been able to reach her the last 2 days  He reduced his lithium level the last 3 days, thinking he was taking too much, missed 3 doses. Last lithium level in July was 0.87, level in ED was 0.5  Uncertain what triggered his most recent manic episode-reports he was feeling really well and the prior 5 days felt energetic and excelling at work, was being praised for his work. Admits to not taking full lunches and reports he was overworking (which tends to be a common trigger for hypermania). His boss supports him to take lunch breaks and not overwork. Patient admits to an all or nothing type personality. He states he had not been sleeping much the last few night, consistently waking up at 3am. His wife is aware of the situation and she drove him to the ED when he realized he was manic. Denies alcohol use, sober 160 days. Now feels hypomanic and as if he is crashing, felt similar to the flu the last 2 days but feels improved today. He feels safe to go home and take it easy. Not scheduled to work today or tomorrow. No FMLA on file, but we did discuss this. Unknown triggers for isreal.  ED Location: [facility]  Date: 8/29/2024  Left AMA due to a 3 hour wait    Review of Systems   Constitutional:  Positive for fatigue.   Musculoskeletal:  Positive for myalgias.   Psychiatric/Behavioral:  Negative for confusion, self-injury and suicidal ideas. The patient is not hyperactive.    All other systems reviewed and are negative.    Objective   /88 (BP Location: Left arm, Patient Position: Sitting, BP Cuff Size: Large adult)   Pulse 89   Temp 37.4 °C (99.4 °F) (Temporal)   Resp 16   Wt 89.4 kg (197 lb 3.2 oz)   SpO2 97%   BMI 28.30 kg/m²     Physical Exam  Constitutional: Well developed, well nourished, alert and in no acute distress   Eyes: Normal external exam.    Cardiovascular: No peripheral edema.  Pulmonary: No respiratory distress  Skin: Warm, well perfused, normal skin turgor and color.   Neurologic: Cranial nerves II-XII grossly intact.   Psychiatric: Mood calm and affect normal.    Assessment/Plan   ED note reviewed and labs    *Gene Sight test ordered to guide us in your medication management, please go back to your prior Lithium dosing    Check TSH    Consider consistent counseling     Continue with Mary, but please reach out to me if you are having any issues

## 2024-08-30 ENCOUNTER — APPOINTMENT (OUTPATIENT)
Dept: HEMATOLOGY/ONCOLOGY | Facility: CLINIC | Age: 49
End: 2024-08-30
Payer: COMMERCIAL

## 2024-08-30 DIAGNOSIS — F30.10: Primary | ICD-10-CM

## 2024-08-30 DIAGNOSIS — F31.10 BIPOLAR I DISORDER WITH MANIA (MULTI): ICD-10-CM

## 2024-08-30 DIAGNOSIS — F31.9 BIPOLAR DEPRESSION (MULTI): ICD-10-CM

## 2024-08-30 RX ORDER — LITHIUM CARBONATE 600 MG/1
600 CAPSULE ORAL
Qty: 60 CAPSULE | Refills: 0 | Status: SHIPPED | OUTPATIENT
Start: 2024-08-30 | End: 2024-09-29

## 2024-08-30 RX ORDER — QUETIAPINE FUMARATE 100 MG/1
100 TABLET, FILM COATED ORAL NIGHTLY
Qty: 30 TABLET | Refills: 0 | Status: SHIPPED | OUTPATIENT
Start: 2024-08-30 | End: 2024-09-29

## 2024-09-04 ENCOUNTER — TELEMEDICINE (OUTPATIENT)
Dept: PRIMARY CARE | Facility: CLINIC | Age: 49
End: 2024-09-04
Payer: COMMERCIAL

## 2024-09-04 DIAGNOSIS — F31.9 BIPOLAR DEPRESSION (MULTI): ICD-10-CM

## 2024-09-04 DIAGNOSIS — F41.9 ANXIETY: ICD-10-CM

## 2024-09-04 DIAGNOSIS — F30.10: Primary | ICD-10-CM

## 2024-09-04 DIAGNOSIS — F30.10: ICD-10-CM

## 2024-09-04 DIAGNOSIS — F31.9 BIPOLAR DEPRESSION (MULTI): Primary | ICD-10-CM

## 2024-09-04 PROCEDURE — 99212 OFFICE O/P EST SF 10 MIN: CPT | Performed by: FAMILY MEDICINE

## 2024-09-04 ASSESSMENT — ENCOUNTER SYMPTOMS: FATIGUE: 1

## 2024-09-04 NOTE — PROGRESS NOTES
"Subjective   Patient ID: Jason Ceballos is a 49 y.o. male who presents for follow up on mood and completion of accommodations for work forms).  I have communicated my name and active licensure. The patient's identity and physical location were verified at the time of this visit. Either the patient or their legal representative has been informed of the risks and benefits of, and alternatives to, treatment through a remote evaluation and consents to proceed with the evaluation remotely.     HPI   Patient has not been able to schedule with psych yet, but has been referred  Reports he is still \"crashing\" from hypermania  We agree to a work 2 days, off 1 day work schedule with accommodations within the work days (work 2 hours, 15 minute break, work 2 hours   Return back to work on 9/9 tentatively    Review of Systems   Constitutional:  Positive for fatigue.   All other systems reviewed and are negative.    Objective   There were no vitals taken for this visit.    Physical Exam  Unable to perform    Assessment/Plan   Forms completed with patient today       "

## 2024-09-05 DIAGNOSIS — F31.9 BIPOLAR DEPRESSION (MULTI): Primary | ICD-10-CM

## 2024-09-05 DIAGNOSIS — F30.10: ICD-10-CM

## 2024-09-10 ENCOUNTER — TELEPHONE (OUTPATIENT)
Dept: PRIMARY CARE | Facility: CLINIC | Age: 49
End: 2024-09-10

## 2024-09-10 NOTE — TELEPHONE ENCOUNTER
This was faxed again on 9/5 after the forms were filled out. Information given to patient's spouse per consent. Patient will call if they have any questions or concerns.

## 2024-09-10 NOTE — TELEPHONE ENCOUNTER
Per patients wife, patient did not receive a phone call from  access clinic for STAT referral (wife would like contact info)     He is currently on his way back to [facility], left AMA yesterday     Wife wanted us to double check that his [facility] paperwork got sent over

## 2024-09-11 ENCOUNTER — PATIENT OUTREACH (OUTPATIENT)
Dept: PRIMARY CARE | Facility: CLINIC | Age: 49
End: 2024-09-11
Payer: COMMERCIAL

## 2024-09-11 NOTE — TELEPHONE ENCOUNTER
----- Message from Blanca Urbina sent at 9/11/2024 11:53 AM EDT -----  Regarding: TCM hosp fu  Discharge facility: [facility]  Discharge diagnosis:   alcohol detox, bipolar  Date of discharge:      9/7/24  Unsuccessful attempts x2 to reach patient for PCP Follow-up  Please have office staff reach out to patient and schedule an appointment within 14 days from discharge date, thank you!

## 2024-09-12 DIAGNOSIS — R11.2 NAUSEA AND VOMITING, UNSPECIFIED VOMITING TYPE: Primary | ICD-10-CM

## 2024-09-12 RX ORDER — ONDANSETRON 8 MG/1
8 TABLET, ORALLY DISINTEGRATING ORAL EVERY 8 HOURS PRN
Qty: 15 TABLET | Refills: 0 | Status: SHIPPED | OUTPATIENT
Start: 2024-09-12 | End: 2024-09-17

## 2024-09-24 ENCOUNTER — TELEMEDICINE (OUTPATIENT)
Dept: PRIMARY CARE | Facility: CLINIC | Age: 49
End: 2024-09-24
Payer: COMMERCIAL

## 2024-09-24 DIAGNOSIS — R79.89 ELEVATED TSH: Primary | ICD-10-CM

## 2024-09-24 DIAGNOSIS — F41.1 GAD (GENERALIZED ANXIETY DISORDER): ICD-10-CM

## 2024-09-24 DIAGNOSIS — F41.0 PANIC ATTACKS: ICD-10-CM

## 2024-09-24 DIAGNOSIS — F31.10 BIPOLAR I DISORDER WITH MANIA (MULTI): ICD-10-CM

## 2024-09-24 DIAGNOSIS — F30.10: ICD-10-CM

## 2024-09-24 DIAGNOSIS — F51.04 PSYCHOPHYSIOLOGICAL INSOMNIA: ICD-10-CM

## 2024-09-24 PROCEDURE — 99214 OFFICE O/P EST MOD 30 MIN: CPT | Performed by: FAMILY MEDICINE

## 2024-09-24 PROCEDURE — 1036F TOBACCO NON-USER: CPT | Performed by: FAMILY MEDICINE

## 2024-09-24 RX ORDER — LORAZEPAM 0.5 MG/1
0.5 TABLET ORAL DAILY PRN
Qty: 10 TABLET | Refills: 0 | Status: SHIPPED | OUTPATIENT
Start: 2024-09-24 | End: 2024-10-04

## 2024-09-24 ASSESSMENT — ENCOUNTER SYMPTOMS
TREMORS: 1
NERVOUS/ANXIOUS: 1
SLEEP DISTURBANCE: 1

## 2024-09-24 NOTE — PROGRESS NOTES
"Subjective   Patient ID: Jason Ceballos is a 49 y.o. male who presents for a hospital discharge follow up.     HPI   Patient was admitted to [facility] for acute manic episode (bipolar). Got home on Sunday.   Current medication regimen:  Added Abilify 5mg BID to regimen, started it Tuesday but stopped it yesterday due to tremors. Has only missed one dose of today.   Lithium level yesterday was 0.6 (had missed 2 doses of Lithium), was told to increase lithium to 600mg in am and 900mg at bedtime but not taking it, taking seroquel 100mg at bedtime, if we takes 200mg at night he feels very groggy the next day. Has not tried 150mg.   Appt with psych is 10/2  His primary complaint is insomnia, having anxiety and hypermania.   He has tried Vistaril for sleep without relief. A few days ago he took close to 200mg for uncontrolled anxiety but developed shaking while in the store and his body \"locked up\" and he was not able to function or think.   Took trazodone in the past and sleepy time tea without relief.   No current alcohol use, last drink was 2 weeks ago.  Patient took half of ativan 0.5mg (wife's med) and he felt improved and was able to reduce his panic.     TSH was 5.34, T4 0.94 per patient   No fmhx thyroid disease known to patient    Review of Systems   Neurological:  Positive for tremors.   Psychiatric/Behavioral:  Positive for sleep disturbance. The patient is nervous/anxious.    All other systems reviewed and are negative.    Objective   There were no vitals taken for this visit.    Physical Exam  Constitutional: Well developed, well nourished, alert and in no acute distress   Eyes: Normal external exam. bilaterally. No wheezes, rhonchi, rales.  Skin: normal skin color  Neurologic: Cranial nerves II-XII grossly intact.   Psychiatric: Mood calm and affect anxious, normal speech.    Assessment/Plan   Pending gene sight testing    Restart Abilify 5mg 2x/day  Continue lithium at current dose regimen  Increase " seroquel to 150mg  START half a tab of ativan for only as needed use for panic. We discussed you will get a very small one time script for this as this medication is highly addictive and can cause tolerance. Do not drink alcohol with this as this medication is a benzodiazepine. We feel the current benefits short term outweigh the risks at this time.     I personally have reviewed the OARRS report for this patient.  This report is scanned into the electronic medical record.  I have considered the risks of abuse, dependence, addiction and diversion.  I believe that it is clinically appropriate for the patient to be prescribed this medication.    Keep appointment with psych 9on 10/2    Obtain records from [facility]    Short term disability paperwork pending

## 2024-09-27 ENCOUNTER — APPOINTMENT (OUTPATIENT)
Dept: HEMATOLOGY/ONCOLOGY | Facility: CLINIC | Age: 49
End: 2024-09-27
Payer: COMMERCIAL

## 2024-10-08 ENCOUNTER — APPOINTMENT (OUTPATIENT)
Dept: PRIMARY CARE | Facility: CLINIC | Age: 49
End: 2024-10-08
Payer: COMMERCIAL

## 2024-10-10 ENCOUNTER — TELEPHONE (OUTPATIENT)
Dept: SCHEDULING | Age: 49
End: 2024-10-10
Payer: COMMERCIAL

## 2024-10-10 NOTE — TELEPHONE ENCOUNTER
Patient called in to reschedule his upcoming FUV with [facility] and his infusion. He said it was too soon for his employer. He was requesting to delay this for a couple of weeks. I told him that Dr. Mason was going to be OOO beginning mid Nov to mid Dec so I would need to check with his clinical team to see what they are able to come up with. He understood. He stated he donates blood and this keeps him regulated, so he feels he would be fine to wait until Dec to evelio the FUV and infusion appt's.     I told him I would message and clinical team and see what they recommend. He understood and agreed.    Where may we reschedule this patient?

## 2024-10-16 ENCOUNTER — APPOINTMENT (OUTPATIENT)
Dept: PRIMARY CARE | Facility: CLINIC | Age: 49
End: 2024-10-16
Payer: COMMERCIAL

## 2024-10-17 ENCOUNTER — TELEMEDICINE (OUTPATIENT)
Dept: PRIMARY CARE | Facility: CLINIC | Age: 49
End: 2024-10-17

## 2024-10-17 DIAGNOSIS — F41.1 GAD (GENERALIZED ANXIETY DISORDER): Primary | ICD-10-CM

## 2024-10-17 DIAGNOSIS — F41.0 PANIC ATTACKS: ICD-10-CM

## 2024-10-17 PROCEDURE — 99213 OFFICE O/P EST LOW 20 MIN: CPT | Performed by: FAMILY MEDICINE

## 2024-10-17 PROCEDURE — 1036F TOBACCO NON-USER: CPT | Performed by: FAMILY MEDICINE

## 2024-10-17 RX ORDER — ARIPIPRAZOLE 10 MG/1
10 TABLET ORAL DAILY
COMMUNITY
Start: 2024-10-08

## 2024-10-17 RX ORDER — LORAZEPAM 0.5 MG/1
0.5 TABLET ORAL DAILY PRN
Qty: 10 TABLET | Refills: 0 | Status: SHIPPED | OUTPATIENT
Start: 2024-10-17 | End: 2024-10-27

## 2024-10-17 ASSESSMENT — PATIENT HEALTH QUESTIONNAIRE - PHQ9
6. FEELING BAD ABOUT YOURSELF - OR THAT YOU ARE A FAILURE OR HAVE LET YOURSELF OR YOUR FAMILY DOWN: SEVERAL DAYS
4. FEELING TIRED OR HAVING LITTLE ENERGY: SEVERAL DAYS
7. TROUBLE CONCENTRATING ON THINGS, SUCH AS READING THE NEWSPAPER OR WATCHING TELEVISION: SEVERAL DAYS
9. THOUGHTS THAT YOU WOULD BE BETTER OFF DEAD, OR OF HURTING YOURSELF: NOT AT ALL
5. POOR APPETITE OR OVEREATING: SEVERAL DAYS
SUM OF ALL RESPONSES TO PHQ9 QUESTIONS 1 AND 2: 2
8. MOVING OR SPEAKING SO SLOWLY THAT OTHER PEOPLE COULD HAVE NOTICED. OR THE OPPOSITE, BEING SO FIGETY OR RESTLESS THAT YOU HAVE BEEN MOVING AROUND A LOT MORE THAN USUAL: SEVERAL DAYS
2. FEELING DOWN, DEPRESSED OR HOPELESS: SEVERAL DAYS
1. LITTLE INTEREST OR PLEASURE IN DOING THINGS: SEVERAL DAYS
SUM OF ALL RESPONSES TO PHQ QUESTIONS 1-9: 8
3. TROUBLE FALLING OR STAYING ASLEEP OR SLEEPING TOO MUCH: SEVERAL DAYS

## 2024-10-17 ASSESSMENT — ANXIETY QUESTIONNAIRES
2. NOT BEING ABLE TO STOP OR CONTROL WORRYING: SEVERAL DAYS
4. TROUBLE RELAXING: SEVERAL DAYS
GAD7 TOTAL SCORE: 7
1. FEELING NERVOUS, ANXIOUS, OR ON EDGE: MORE THAN HALF THE DAYS
5. BEING SO RESTLESS THAT IT IS HARD TO SIT STILL: SEVERAL DAYS
3. WORRYING TOO MUCH ABOUT DIFFERENT THINGS: SEVERAL DAYS
7. FEELING AFRAID AS IF SOMETHING AWFUL MIGHT HAPPEN: SEVERAL DAYS
6. BECOMING EASILY ANNOYED OR IRRITABLE: NOT AT ALL

## 2024-10-17 ASSESSMENT — ENCOUNTER SYMPTOMS: NERVOUS/ANXIOUS: 1

## 2024-10-17 NOTE — LETTER
October 17, 2024     Patient: Jason Ceballos   YOB: 1975   Date of Visit: 10/17/2024       To Whom It May Concern:    Jason Ceballos was seen in my clinic on 10/17/2024 at 8:30 am. Please excuse Jason for his absence from work on this day to make the appointment. It is highly advised that he not return to work until 11/4/2024 or until seen by his specialist. I greatly appreciate your assistance in this matter.     If you have any questions or concerns, please don't hesitate to call.         Sincerely,         Martine Becker, DO        CC: No Recipients

## 2024-10-17 NOTE — PROGRESS NOTES
Subjective   Patient ID: Jason Ceballos is a 49 y.o. male who presents for FMLA paperwork (Would like extensionof time off from 10/5-10/20. Patient went into work on 10/14, had panic attack and walked out of work. ).  I have communicated my name and active licensure. The patient's identity and physical location were verified at the time of this visit. Either the patient or their legal representative has been informed of the risks and benefits of, and alternatives to, treatment through a remote evaluation and consents to proceed with the evaluation remotely.     HPI   PHQ-9: 8  YVES-7: 7    This past Monday, he tried to go into work and had a panic attack. Patient would like an extension on his FMLA/leave. He reports he feels shaky.   Next appointment with psych is 11/2 ( from [facility]), patient did not reach out to him.   Current medications: lithium, seroquel, abilify, inderal   He reports he is not able to grocery shop.   Sleep-no issues, but sleeping longer than he wants  Appetite-reduced  He has been taking meds consistently  Alcohol-none  Denies current isreal.  He no longer has ativan, but this was helping his panic attacks significantly.     Review of Systems   Psychiatric/Behavioral:  The patient is nervous/anxious.    All other systems reviewed and are negative.    Objective   There were no vitals taken for this visit.    Physical Exam  Constitutional: Well developed, well nourished, alert and in no acute distress   Eyes: Normal external exam.  Pulmonary: No respiratory distress  Neurologic: Cranial nerves II-XII grossly intact.   Psychiatric: Mood calm and affect normal.    Assessment/Plan   Letter written in chart for patient to access to extend leave until 11/3. Patient was advised to contact psych today to try to get an appointment sooner than 11/2.   Psych will need to complete short term disability forms for employer.  Continue current medications  I personally have reviewed the OARRS report  for this patient.  This report is scanned into the electronic medical record.  I have considered the risks of abuse, dependence, addiction and diversion.  I believe that it is clinically appropriate for the patient to be prescribed this medication.  Acute script for ativan sent to pharmacy, this will be the last script and this was expressed to the patient. If he feels he should need further refills, will need to discuss and get from psych.

## 2024-10-25 RX ORDER — HEPARIN SODIUM,PORCINE/PF 10 UNIT/ML
50 SYRINGE (ML) INTRAVENOUS AS NEEDED
OUTPATIENT
Start: 2024-10-25

## 2024-10-25 RX ORDER — HEPARIN 100 UNIT/ML
500 SYRINGE INTRAVENOUS AS NEEDED
OUTPATIENT
Start: 2024-10-25

## 2024-10-28 RX ORDER — DIPHENHYDRAMINE HYDROCHLORIDE 50 MG/ML
50 INJECTION INTRAMUSCULAR; INTRAVENOUS AS NEEDED
OUTPATIENT
Start: 2024-10-28

## 2024-10-28 RX ORDER — EPINEPHRINE 0.3 MG/.3ML
0.3 INJECTION SUBCUTANEOUS EVERY 5 MIN PRN
OUTPATIENT
Start: 2024-10-28

## 2024-10-28 RX ORDER — FAMOTIDINE 10 MG/ML
20 INJECTION INTRAVENOUS ONCE AS NEEDED
OUTPATIENT
Start: 2024-10-28

## 2024-10-28 RX ORDER — ALBUTEROL SULFATE 0.83 MG/ML
3 SOLUTION RESPIRATORY (INHALATION) AS NEEDED
OUTPATIENT
Start: 2024-10-28

## 2024-10-31 ENCOUNTER — APPOINTMENT (OUTPATIENT)
Dept: PRIMARY CARE | Facility: CLINIC | Age: 49
End: 2024-10-31
Payer: COMMERCIAL

## 2024-11-01 ENCOUNTER — APPOINTMENT (OUTPATIENT)
Dept: HEMATOLOGY/ONCOLOGY | Facility: CLINIC | Age: 49
End: 2024-11-01
Payer: COMMERCIAL

## 2024-11-06 ENCOUNTER — TELEMEDICINE (OUTPATIENT)
Dept: PRIMARY CARE | Facility: CLINIC | Age: 49
End: 2024-11-06
Payer: COMMERCIAL

## 2024-11-06 DIAGNOSIS — F31.9 BIPOLAR DEPRESSION (MULTI): ICD-10-CM

## 2024-11-06 DIAGNOSIS — F41.1 GAD (GENERALIZED ANXIETY DISORDER): Primary | ICD-10-CM

## 2024-11-06 DIAGNOSIS — F41.0 PANIC ATTACKS: ICD-10-CM

## 2024-11-06 PROCEDURE — 1036F TOBACCO NON-USER: CPT | Performed by: FAMILY MEDICINE

## 2024-11-06 PROCEDURE — 99213 OFFICE O/P EST LOW 20 MIN: CPT | Performed by: FAMILY MEDICINE

## 2024-11-06 RX ORDER — LORAZEPAM 0.5 MG/1
0.5 TABLET ORAL DAILY PRN
Qty: 3 TABLET | Refills: 0 | Status: SHIPPED | OUTPATIENT
Start: 2024-11-06 | End: 2024-11-09

## 2024-11-06 ASSESSMENT — ANXIETY QUESTIONNAIRES
7. FEELING AFRAID AS IF SOMETHING AWFUL MIGHT HAPPEN: MORE THAN HALF THE DAYS
4. TROUBLE RELAXING: NEARLY EVERY DAY
6. BECOMING EASILY ANNOYED OR IRRITABLE: NOT AT ALL
GAD7 TOTAL SCORE: 17
3. WORRYING TOO MUCH ABOUT DIFFERENT THINGS: NEARLY EVERY DAY
2. NOT BEING ABLE TO STOP OR CONTROL WORRYING: NEARLY EVERY DAY
5. BEING SO RESTLESS THAT IT IS HARD TO SIT STILL: NEARLY EVERY DAY
1. FEELING NERVOUS, ANXIOUS, OR ON EDGE: NEARLY EVERY DAY
IF YOU CHECKED OFF ANY PROBLEMS ON THIS QUESTIONNAIRE, HOW DIFFICULT HAVE THESE PROBLEMS MADE IT FOR YOU TO DO YOUR WORK, TAKE CARE OF THINGS AT HOME, OR GET ALONG WITH OTHER PEOPLE: EXTREMELY DIFFICULT

## 2024-11-06 ASSESSMENT — PATIENT HEALTH QUESTIONNAIRE - PHQ9
10. IF YOU CHECKED OFF ANY PROBLEMS, HOW DIFFICULT HAVE THESE PROBLEMS MADE IT FOR YOU TO DO YOUR WORK, TAKE CARE OF THINGS AT HOME, OR GET ALONG WITH OTHER PEOPLE: EXTREMELY DIFFICULT
4. FEELING TIRED OR HAVING LITTLE ENERGY: NOT AT ALL
3. TROUBLE FALLING OR STAYING ASLEEP OR SLEEPING TOO MUCH: SEVERAL DAYS
7. TROUBLE CONCENTRATING ON THINGS, SUCH AS READING THE NEWSPAPER OR WATCHING TELEVISION: NOT AT ALL
8. MOVING OR SPEAKING SO SLOWLY THAT OTHER PEOPLE COULD HAVE NOTICED. OR THE OPPOSITE, BEING SO FIGETY OR RESTLESS THAT YOU HAVE BEEN MOVING AROUND A LOT MORE THAN USUAL: SEVERAL DAYS
5. POOR APPETITE OR OVEREATING: NOT AT ALL
9. THOUGHTS THAT YOU WOULD BE BETTER OFF DEAD, OR OF HURTING YOURSELF: NOT AT ALL
SUM OF ALL RESPONSES TO PHQ QUESTIONS 1-9: 4
1. LITTLE INTEREST OR PLEASURE IN DOING THINGS: NOT AT ALL
SUM OF ALL RESPONSES TO PHQ9 QUESTIONS 1 AND 2: 1
2. FEELING DOWN, DEPRESSED OR HOPELESS: SEVERAL DAYS
6. FEELING BAD ABOUT YOURSELF - OR THAT YOU ARE A FAILURE OR HAVE LET YOURSELF OR YOUR FAMILY DOWN: SEVERAL DAYS

## 2024-11-06 ASSESSMENT — ENCOUNTER SYMPTOMS: NERVOUS/ANXIOUS: 1

## 2024-11-06 NOTE — PROGRESS NOTES
Subjective   Patient ID: Jason Ceballos is a 49 y.o. male who presents for Panic Attack.  I have communicated my name and active licensure. The patient's identity and physical location were verified at the time of this visit. Either the patient or their legal representative has been informed of the risks and benefits of, and alternatives to, treatment through a remote evaluation and consents to proceed with the evaluation remotely.       HPI   PHQ-9: 4  YVES-7: 17  Patient has been following with psych, he thought abilify was causing panic attacks. Has been off the med for 2 weeks, feels improved by 50%. Last met with psych 1.5 weeks ago.   He reports he has been pushing himself to go out in public and avoid self isolation.   Went to the park the other day (day prior to returning to work), developed a 10 hour panic attack. He plans to return back to work tomorrow and feels confident, but also feels he is having a pre-panic attack.   Patient reports inderal is mildly helpful, but not enough to get him through a work day. Last time he attempted to return back to work, he had a panic attack and had to leave.   The only thing that improves his pre-panic is taking a benzo (ativan)  He is not doing individual counseling    Review of Systems   Psychiatric/Behavioral:  The patient is nervous/anxious.    All other systems reviewed and are negative.    Objective   There were no vitals taken for this visit.    Physical Exam  Constitutional: Well developed, well nourished, alert and in no acute distress   Eyes: Normal external exam  Pulmonary: No respiratory distress  Neurologic: Cranial nerves II-XII grossly intact.   Psychiatric: Mood calm and affect normal.    Assessment/Plan   I personally have reviewed the OARRS report for this patient.  This report is scanned into the electronic medical record.  I have considered the risks of abuse, dependence, addiction and diversion.  I believe that it is clinically appropriate for the  patient to be prescribed this medication.    Ativan script sent to pharmacy, discussed with patient that this will be his final script.    He was encouraged to seek individual, intensive counseling    Psychologist & Psychiatrist  [facility] [phone]  [facility] [phone]    [facility]   [email or web address]  [address]  [place] [address]  [phone]     [facility] [place] [address], [place] [address]  [place] [address]  [phone]    [address]  [place] [address]  [phone]    [facility]  [email or web address]  [address], [address]  [place] [address]  Phone [phone]    [facility]  <[email or web address]  [address] [place] [address]   Phone [phone]    [facility]  [address] [place] [address]  [phone]     [facility]   [email or web address]   [address] [place] [address]  [phone]    [facility]  [address] [place] [address] [address] [address]  [phone]    [facility]   [address] [place] [address]  [phone]    [facility]  <[email or web address]  [address]   Phone [phone]    [facility]  <[email or web address]  [address] [place] [address]  [phone]    [facility]  <[email or web address]   [address], [address], [place] [address]  Phone: [phone]    Pediatric Psychology and Psychiatry   [facility]   [address] [place] [address], [address]   [place] [address]  Phone:  [phone]    Mandeep Lemus MD  Child and Adolescent Psychiatry  Adult Psychiatry  Telephone: [phone] [address] [place] [address]    Continue all other current medications per psych. Reaching out to psych () regarding medication regimen and plan.

## 2024-11-27 ENCOUNTER — APPOINTMENT (OUTPATIENT)
Dept: PRIMARY CARE | Facility: CLINIC | Age: 49
End: 2024-11-27
Payer: COMMERCIAL

## 2024-12-26 ENCOUNTER — APPOINTMENT (OUTPATIENT)
Dept: HEMATOLOGY/ONCOLOGY | Facility: CLINIC | Age: 49
End: 2024-12-26
Payer: COMMERCIAL

## 2025-01-02 DIAGNOSIS — D75.1 POLYCYTHEMIA: ICD-10-CM

## 2025-01-07 ENCOUNTER — TELEPHONE (OUTPATIENT)
Dept: HEMATOLOGY/ONCOLOGY | Facility: CLINIC | Age: 50
End: 2025-01-07
Payer: COMMERCIAL

## 2025-01-07 NOTE — TELEPHONE ENCOUNTER
----- Message from Nurse Denise OBREGON sent at 2025  1:10 PM EST -----  Regardin/16  Hi currently this patient is scheduled  at 1140. If he sees doc first and then gets phelb the lab will be closed until 1pm. I am not sure if he gets labs ahead of time. From notes in epic looks like he has a lot of anxiety issues so just want to make it go more smoothly.  Suggestions? Does dorene have a later time or can he get labs ahead of time. ??  ----- Message -----  From: Sarah Goodwin  Sent: 2025   9:19 AM EST  To: Denise Maloney RN    This patient is seeing Dr. Mason on  at 11:40. Phleb is ordered that day also, are you able to accommodate? Thanks!

## 2025-01-16 ENCOUNTER — APPOINTMENT (OUTPATIENT)
Dept: HEMATOLOGY/ONCOLOGY | Facility: CLINIC | Age: 50
End: 2025-01-16
Payer: COMMERCIAL

## 2025-02-20 ENCOUNTER — TELEPHONE (OUTPATIENT)
Dept: HEMATOLOGY/ONCOLOGY | Facility: CLINIC | Age: 50
End: 2025-02-20
Payer: COMMERCIAL

## 2025-02-20 NOTE — TELEPHONE ENCOUNTER
"RN called pt to discuss his request to cancel his upcoming appointment on 2/27/25 @ 10:40 am. Pt told RN that he is currently under the care of psych for a bipolar diagnosis and he stated that he was \"mixing meds and this led to a new diagnosis of agoraphobia\" . He stated that he is \"not physically ready to go out in public at this point\". He also mentioned that he was going to talk to the psychologist about potentially getting a prescription for Valium to take that way he can reschedule and make his appointment with Dr. Mason. Pt declined wanting to reschedule this appointment at this time but will call back when he feels ready to do so.   "

## 2025-02-21 NOTE — TELEPHONE ENCOUNTER
Informed Dr. Mason of previous note and cancellation of FUV/Phlebotomy.  Message forwarded to  Sarahy to inform.

## 2025-02-27 ENCOUNTER — TELEPHONE (OUTPATIENT)
Dept: SCHEDULING | Age: 50
End: 2025-02-27
Payer: COMMERCIAL

## 2025-02-27 ENCOUNTER — APPOINTMENT (OUTPATIENT)
Dept: HEMATOLOGY/ONCOLOGY | Facility: CLINIC | Age: 50
End: 2025-02-27
Payer: COMMERCIAL

## 2025-02-27 NOTE — TELEPHONE ENCOUNTER
There are notes on active requests stating he does not want to reschedule missed fuv appts with dr catherine.

## 2025-03-20 ENCOUNTER — TELEMEDICINE (OUTPATIENT)
Dept: PRIMARY CARE | Facility: CLINIC | Age: 50
End: 2025-03-20
Payer: COMMERCIAL

## 2025-03-20 VITALS
SYSTOLIC BLOOD PRESSURE: 96 MMHG | DIASTOLIC BLOOD PRESSURE: 63 MMHG | RESPIRATION RATE: 14 BRPM | OXYGEN SATURATION: 95 % | TEMPERATURE: 96.6 F | HEART RATE: 101 BPM

## 2025-03-20 DIAGNOSIS — Z79.899 MEDICATION MANAGEMENT: ICD-10-CM

## 2025-03-20 DIAGNOSIS — R80.9 PROTEINURIA, UNSPECIFIED TYPE: ICD-10-CM

## 2025-03-20 DIAGNOSIS — N50.819 PAIN IN TESTICLE, UNSPECIFIED LATERALITY: Primary | ICD-10-CM

## 2025-03-20 DIAGNOSIS — Z00.00 ROUTINE HEALTH MAINTENANCE: ICD-10-CM

## 2025-03-20 DIAGNOSIS — F40.00 AGORAPHOBIA: ICD-10-CM

## 2025-03-20 DIAGNOSIS — F41.9 ANXIETY: ICD-10-CM

## 2025-03-20 PROCEDURE — 1036F TOBACCO NON-USER: CPT | Performed by: FAMILY MEDICINE

## 2025-03-20 PROCEDURE — 99214 OFFICE O/P EST MOD 30 MIN: CPT | Performed by: FAMILY MEDICINE

## 2025-03-20 RX ORDER — CIPROFLOXACIN 500 MG/1
500 TABLET ORAL 2 TIMES DAILY
Qty: 20 TABLET | Refills: 0 | Status: SHIPPED | OUTPATIENT
Start: 2025-03-20 | End: 2025-03-30

## 2025-03-20 ASSESSMENT — ENCOUNTER SYMPTOMS
FEVER: 0
HEMATURIA: 0
DIFFICULTY URINATING: 0
DYSURIA: 0

## 2025-03-20 NOTE — PROGRESS NOTES
Subjective   Patient ID: Jason Ceballos is a 49 y.o. male who presents for mood and prostate/testicular pain. Patient presented in person, not virtually. Mother drove him.     HPI   Following with psych, taking ativan, prescribed this month  Going to counseling at [facility]  PHQ-9: 6  YVES-7: 7  Anxiety is improving, no panic attacks. Triggered by unknown (generalized), pending deadlines  Got a job yesterday (remote) and is looking forward to this  Sleeping too much, but trying to improve routine (needs 9 hours/night)  Craving carbs at night time, but normal appetite otherwise   Walking, but not lifting weights  Last manic episode was 6mo ago, he feels he is stable and not manic at this time  Stopped pm lithium dose and feels improved, still taking morning dose       R testicular pain  X 1 month  No longer following with urology  Has had 7 episodes since his early 20s  Taking cialis in past, but not currently  Denies dysuria, hematuria  Reports bilateral testicular pain, R>L,no edema or redness  Cipro improved his symptoms in the past   Been taking ibuprofen x weeks  Proteinuria  Renal 3/2021 RESULT:   Right Kidney:        -Renal length: 12.4 cm        -Parenchyma: Normal parenchymal echogenicity.  Normal parenchymal   thickness.       -Collecting system: No hydronephrosis.        -Calculus: No echogenic, shadowing calculus.        -Lesion:  None.     Left Kidney:        -Renal length: 12.2 cm        -Parenchyma: Normal parenchymal echogenicity.  Normal parenchymal   thickness.       -Collecting system: No hydronephrosis.        -Calculus: No echogenic, shadowing calculus.        -Lesion:  None.   Bladder: Partially distended, otherwise normal sonographic appearance.     Review of Systems   Constitutional:  Negative for fever.   Genitourinary:  Positive for scrotal swelling and testicular pain. Negative for decreased urine volume, difficulty urinating, dysuria, hematuria and urgency.   All other systems reviewed and  are negative.    Objective   BP 96/63 (BP Location: Left arm, Patient Position: Sitting, BP Cuff Size: Adult)   Pulse 101   Temp 35.9 °C (96.6 °F) (Temporal)   Resp 14   SpO2 95%     Physical Exam  Chaperone Present: Declined.  Chaperone Name/Title: Jazmyne Murillo MA  Examination Chaperoned: Entire Physical Exam and Genitourinary Exam     Constitutional: Well developed, well nourished, alert and in no acute distress but seems anxious   Eyes: Normal external exam.   Cardiovascular: Regular rate and rhythm, normal S1 and S2, no murmurs, gallops, or rubs. Radial pulses normal. No peripheral edema.  Pulmonary: No respiratory distress, lungs clear to auscultation bilaterally. No wheezes, rhonchi, rales.  Abdomen: +bs, soft, NT, ND, negative for bilateral inguinal lymphadenopathy  : erythema (mild) bilateral scrotum, no masses palpated, NTTP on exam, no skin lesions/rashes  Skin: Warm, well perfused, normal skin turgor and color.   Neurologic: Cranial nerves II-XII grossly intact.   Psychiatric: Mood calm and affect normal.    Assessment/Plan   Keep appointment with [facility] in April, continue current medications as directed by psych  Continue therapy routinely    IO UA-+proteinuria  (please repeat urinarlysis at the lab in 4 weeks)  Pending urine culture and fasting labs    START ciprofloxacin antibiotic as directed. We discussed interaction with seroquel an potential cardiac dysrhythmia, but you are no a low dose of seroquel. We also discussed risk of tendon rupture with high impact activity/exercise (please avoid).   Please update me in 7-10 days, if symptoms are not improved you will need an ultrasound to evaluate further        Repeat urine test and fasting labs due in 4 weeks

## 2025-03-24 LAB — BACTERIA UR CULT: NORMAL

## 2025-03-25 ENCOUNTER — TELEPHONE (OUTPATIENT)
Dept: PRIMARY CARE | Facility: CLINIC | Age: 50
End: 2025-03-25

## 2025-03-25 NOTE — TELEPHONE ENCOUNTER
Msg received from [employer] that the Urine Culture could not be performed due to improper specimen submitted. Paperwork received on 3/24 via fax over night.  I am not able to scan this in due to multiple names on the paperwork and HIPAA.

## 2025-04-03 ENCOUNTER — TELEMEDICINE (OUTPATIENT)
Dept: PRIMARY CARE | Facility: CLINIC | Age: 50
End: 2025-04-03
Payer: COMMERCIAL

## 2025-04-03 DIAGNOSIS — N50.819 PAIN IN TESTICLE, UNSPECIFIED LATERALITY: Primary | ICD-10-CM

## 2025-04-03 ASSESSMENT — ENCOUNTER SYMPTOMS
FREQUENCY: 0
NERVOUS/ANXIOUS: 1

## 2025-04-03 NOTE — PROGRESS NOTES
Subjective   Patient ID: Jason Ceballos is a 49 y.o. male who presents for Follow-up (Urinary symptoms ).  I have communicated my name and active licensure. The patient's identity and physical location were verified at the time of this visit. Either the patient or their legal representative has been informed of the risks and benefits of, and alternatives to, treatment through a remote evaluation and consents to proceed with the evaluation remotely.     HPI   Finished cipro antibiotic (Friday), urine culture was not repeated by patient. He was told he had diphtherias. Patient felt better on cipro initially (60% improved), but prostate pain is now a 5/10 again. Labs not performed yet. Has more pain on right scrotal region, 4 days after finishing abx. Denies fevers.    Review of Systems   Genitourinary:  Positive for testicular pain. Negative for decreased urine volume, frequency, penile discharge, penile pain, penile swelling, scrotal swelling and urgency.   Psychiatric/Behavioral:  The patient is nervous/anxious.    All other systems reviewed and are negative.    Objective   There were no vitals taken for this visit.    Physical Exam  Constitutional: Well developed, well nourished, alert and in no acute distress   Eyes: Normal external exam.   Pulmonary: No respiratory distres  Neurologic: Cranial nerves II-XII grossly intact.   Psychiatric: Mood calm and affect normal.    Assessment/Plan   Urine testing ordered for repeat, pending labs (psa, cbc, etc)  Scrotal US ordered    If your symptoms worsen, it is advised to proceed to urologist or ED for expedited work up

## 2025-04-06 LAB — BACTERIA UR CULT: NORMAL

## 2025-04-06 NOTE — RESULT ENCOUNTER NOTE
Negative urine culture, no infection in urine. Recommend scrotal ultrasound, which has been ordered. Please call our office Monday and ask for Leslie for assistance with scheduling. If pain or symptoms have worsened, please proceed to the ED.

## 2025-04-17 ENCOUNTER — HOSPITAL ENCOUNTER (OUTPATIENT)
Dept: RADIOLOGY | Facility: CLINIC | Age: 50
Discharge: HOME | End: 2025-04-17
Payer: COMMERCIAL

## 2025-04-17 DIAGNOSIS — N50.819 PAIN IN TESTICLE, UNSPECIFIED LATERALITY: ICD-10-CM

## 2025-04-17 PROCEDURE — 93975 VASCULAR STUDY: CPT

## 2025-04-18 LAB
ALBUMIN SERPL-MCNC: 4.7 G/DL (ref 3.6–5.1)
ALP SERPL-CCNC: 53 U/L (ref 36–130)
ALT SERPL-CCNC: 29 U/L (ref 9–46)
ANION GAP SERPL CALCULATED.4IONS-SCNC: 10 MMOL/L (CALC) (ref 7–17)
APPEARANCE UR: CLEAR
AST SERPL-CCNC: 17 U/L (ref 10–40)
BILIRUB SERPL-MCNC: 0.7 MG/DL (ref 0.2–1.2)
BILIRUB UR QL STRIP: NEGATIVE
BUN SERPL-MCNC: 11 MG/DL (ref 7–25)
CALCIUM SERPL-MCNC: 10.2 MG/DL (ref 8.6–10.3)
CHLORIDE SERPL-SCNC: 103 MMOL/L (ref 98–110)
CHOLEST SERPL-MCNC: 189 MG/DL
CHOLEST/HDLC SERPL: 3.4 (CALC)
CO2 SERPL-SCNC: 28 MMOL/L (ref 20–32)
COLOR UR: YELLOW
CREAT SERPL-MCNC: 1.21 MG/DL (ref 0.6–1.29)
EGFRCR SERPLBLD CKD-EPI 2021: 73 ML/MIN/1.73M2
ERYTHROCYTE [DISTWIDTH] IN BLOOD BY AUTOMATED COUNT: 12.7 % (ref 11–15)
EST. AVERAGE GLUCOSE BLD GHB EST-MCNC: 103 MG/DL
EST. AVERAGE GLUCOSE BLD GHB EST-SCNC: 5.7 MMOL/L
GLUCOSE SERPL-MCNC: 88 MG/DL (ref 65–139)
GLUCOSE UR QL STRIP: NEGATIVE
HBA1C MFR BLD: 5.2 %
HCT VFR BLD AUTO: 52.9 % (ref 38.5–50)
HDLC SERPL-MCNC: 56 MG/DL
HGB BLD-MCNC: 18.1 G/DL (ref 13.2–17.1)
HGB UR QL STRIP: NEGATIVE
KETONES UR QL STRIP: NEGATIVE
LDLC SERPL CALC-MCNC: 110 MG/DL (CALC)
LEUKOCYTE ESTERASE UR QL STRIP: NEGATIVE
LITHIUM SERPL-SCNC: 0.8 MMOL/L (ref 0.6–1.2)
MCH RBC QN AUTO: 29.8 PG (ref 27–33)
MCHC RBC AUTO-ENTMCNC: 34.2 G/DL (ref 32–36)
MCV RBC AUTO: 87 FL (ref 80–100)
NITRITE UR QL STRIP: NEGATIVE
NONHDLC SERPL-MCNC: 133 MG/DL (CALC)
PH UR STRIP: 7.5 [PH] (ref 5–8)
PLATELET # BLD AUTO: 283 THOUSAND/UL (ref 140–400)
PMV BLD REES-ECKER: 9.2 FL (ref 7.5–12.5)
POTASSIUM SERPL-SCNC: 4.3 MMOL/L (ref 3.5–5.3)
PROT SERPL-MCNC: 7.3 G/DL (ref 6.1–8.1)
PROT UR QL STRIP: NEGATIVE
PSA SERPL-MCNC: 1.88 NG/ML
RBC # BLD AUTO: 6.08 MILLION/UL (ref 4.2–5.8)
SODIUM SERPL-SCNC: 141 MMOL/L (ref 135–146)
SP GR UR STRIP: 1.01 (ref 1–1.03)
TRIGL SERPL-MCNC: 121 MG/DL
TSH SERPL-ACNC: 3.15 MIU/L (ref 0.4–4.5)
WBC # BLD AUTO: 7.7 THOUSAND/UL (ref 3.8–10.8)

## 2025-04-19 NOTE — RESULT ENCOUNTER NOTE
Cyst of epididymis on right side. This may self resolve, suggest supportive underwear. However, if pain has not improved, recommend referral to urologist for further intervention

## 2025-04-30 ENCOUNTER — APPOINTMENT (OUTPATIENT)
Dept: UROLOGY | Facility: CLINIC | Age: 50
End: 2025-04-30
Payer: COMMERCIAL

## 2025-04-30 VITALS
BODY MASS INDEX: 28.23 KG/M2 | WEIGHT: 197.2 LBS | DIASTOLIC BLOOD PRESSURE: 90 MMHG | SYSTOLIC BLOOD PRESSURE: 132 MMHG | TEMPERATURE: 98 F | HEIGHT: 70 IN | HEART RATE: 111 BPM

## 2025-04-30 DIAGNOSIS — N50.811 PAIN IN RIGHT TESTICLE: Primary | ICD-10-CM

## 2025-04-30 PROCEDURE — 99213 OFFICE O/P EST LOW 20 MIN: CPT

## 2025-04-30 PROCEDURE — 1036F TOBACCO NON-USER: CPT

## 2025-04-30 PROCEDURE — 3008F BODY MASS INDEX DOCD: CPT

## 2025-04-30 RX ORDER — HYDROXYZINE HYDROCHLORIDE 25 MG/1
25 TABLET, FILM COATED ORAL DAILY
COMMUNITY
Start: 2025-04-13

## 2025-04-30 RX ORDER — ALPRAZOLAM 0.5 MG/1
TABLET ORAL AS NEEDED
COMMUNITY
Start: 2025-04-24

## 2025-04-30 RX ORDER — CELECOXIB 200 MG/1
200 CAPSULE ORAL 2 TIMES DAILY
Qty: 10 CAPSULE | Refills: 0 | Status: SHIPPED | OUTPATIENT
Start: 2025-04-30 | End: 2025-05-05

## 2025-04-30 RX ORDER — CELECOXIB 200 MG/1
200 CAPSULE ORAL 2 TIMES DAILY
COMMUNITY
End: 2025-04-30 | Stop reason: SDUPTHER

## 2025-04-30 NOTE — PROGRESS NOTES
Chief complaint:  Testicle Pain (Right testicle pain but does radiate to the left as well)  Referring physician:  No ref. provider found     SUBJECTIVE:  HPI:  Jason Ceballos is a 50 y.o. male with a history of right testicle pain and anxiety who presents for initial evaluation of right testicle pain 7/10 daily, it improves with painkillers and gets worse while sitting.  IPSS 2/6  BRAD 16.      Medical history:   has a past medical history of Acute upper respiratory infection, unspecified (09/16/2022), Change in bowel habit (01/26/2022), Contusion of right foot, initial encounter (04/29/2019), Impacted cerumen, bilateral (09/03/2019), Other malaise (02/03/2015), Other malaise (10/04/2022), Other shoulder lesions, unspecified shoulder (08/08/2019), Other specified health status, Pain in left shoulder (01/31/2022), Pain in unspecified foot (10/30/2019), Personal history of other diseases of the circulatory system (05/18/2022), Personal history of other diseases of the respiratory system (09/16/2022), Personal history of other diseases of the respiratory system (08/08/2019), Personal history of other specified conditions (01/26/2022), Personal history of other specified conditions (02/26/2022), Strain of muscle and tendon of front wall of thorax, initial encounter (01/31/2022), and Unspecified injury of unspecified foot, initial encounter (09/05/2019).   Surgical history:   has a past surgical history that includes Knee surgery (08/11/2014); Hand surgery (08/11/2014); Other surgical history (08/11/2014); and Other surgical history (08/11/2014).  Family history:  family history includes Breast cancer in his mother; Congenital heart disease in his father; Depression in his brother and sister; Schizophrenia in his brother.  Social history:   reports that he has never smoked. He has never been exposed to tobacco smoke. He has never used smokeless tobacco. He reports that he does not currently use alcohol. He reports that he  does not currently use drugs.    Medications:    Current Outpatient Medications   Medication Instructions    ALPRAZolam (Xanax) 0.5 mg tablet As needed    amLODIPine (NORVASC) 5 mg, oral, Nightly    hydrOXYzine HCL (ATARAX) 25 mg, Daily    lithium 600 mg, oral, 2 times daily (morning and late afternoon)    LORazepam (ATIVAN) 0.5 mg, oral, Daily PRN    losartan (COZAAR) 50 mg, oral, Nightly    propranolol (Inderal) 20 mg tablet 1 tablet, 6 times daily    QUEtiapine (SEROQUEL) 100 mg, oral, Nightly    tadalafil 20 mg, oral, As needed, Take 1 hour before activity    testosterone cypionate (DEPO-TESTOSTERONE) 200 mg, Every 14 days      Allergies:    RX Allergies[1]     ROS:  14-point review of systems negative except as noted above.    OBJECTIVE:  Visit Vitals  /90   Pulse (!) 111   Temp 36.7 °C (98 °F)   Body mass index is 28.3 kg/m².    Physical exam  General:  No acute distress  HEENT:  EOMI  CV:  Regular rate  Pulm:  Nonlabored respirations  Abd:  Soft, non-distended  :  No suprapubic or CVA tenderness. Penis ok. Right testicle ok. R epididymis somehow tender and with some pain when I palpate it. Left testicle ok. GAUTAM: Prostate grade 1, non suspicious.   MSK:  No contractures  Neuro:  Motor intact  Psych:  Appropriate affect    Labs:    Lab Results   Component Value Date    WBC 7.7 04/17/2025    HGB 18.1 (H) 04/17/2025    HCT 52.9 (H) 04/17/2025     04/17/2025    ALT 29 04/17/2025    AST 17 04/17/2025     04/17/2025    K 4.3 04/17/2025     04/17/2025    CREATININE 1.21 04/17/2025    BUN 11 04/17/2025    CO2 28 04/17/2025    HGBA1C 5.2 04/17/2025     CULTURE, URINE, ROUTINE (no units)   Date Value   04/04/2025 SEE NOTE      Lab Results   Component Value Date    PSA 1.88 04/17/2025    PSA 1.64 02/13/2023    PSA 1.77 01/31/2022       Imaging:  All imaging discussed in HPI was independently reviewed. 4 mm right epididymal head cyst. Otherwise, unremarkable scrotal/testicular  ultrasound    ASSESSMENT:  Discussed the causes and management options of scrotal pain.    PLAN:  General measures considering postures and underwear  Celecoxib 200 mg 2 times a day for 5 days    Follow-up 1 month    Franck Adames MD    Problem List Items Addressed This Visit    None  Visit Diagnoses         Pain in right testicle    -  Primary                [1]   Allergies  Allergen Reactions    Abilify [Aripiprazole] Other     Panic attacks    Codeine Other    Opioids - Morphine Analogues Other    Prednisone Unknown and Cough     Sleep apnea with injectable    Phenobarbital Rash

## 2025-06-04 ENCOUNTER — APPOINTMENT (OUTPATIENT)
Dept: UROLOGY | Facility: CLINIC | Age: 50
End: 2025-06-04
Payer: COMMERCIAL

## 2025-06-05 ENCOUNTER — APPOINTMENT (OUTPATIENT)
Age: 50
End: 2025-06-05
Payer: COMMERCIAL

## 2025-06-05 DIAGNOSIS — N50.811 PAIN IN RIGHT TESTICLE: Primary | ICD-10-CM

## 2025-06-05 PROCEDURE — 99212 OFFICE O/P EST SF 10 MIN: CPT

## 2025-06-05 NOTE — PROGRESS NOTES
Jason is doing better of his right testicle pain. He has been doing posture improvements and took the NSAID recommended. He still has some pain, but 5 out of 10 in scale.    We discussed about possible causes, he has been with back pain for quite a while. I recommended him to ask his PCP for an Ortho evaluation nad in case the testicle pain doesn't go away or if it changed let us know to explore further steps to help him. He understands and agrees.    Plan>  - Consider an Ortho evaluation  - Follow up in 4-6 months or PRN

## 2025-06-18 DIAGNOSIS — D75.1 POLYCYTHEMIA: ICD-10-CM

## 2025-06-20 DIAGNOSIS — N52.9 ERECTILE DYSFUNCTION, UNSPECIFIED ERECTILE DYSFUNCTION TYPE: ICD-10-CM

## 2025-06-22 RX ORDER — TADALAFIL 20 MG/1
20 TABLET ORAL AS NEEDED
Qty: 10 TABLET | Refills: 5 | Status: SHIPPED | OUTPATIENT
Start: 2025-06-22 | End: 2025-07-22

## 2025-06-24 ENCOUNTER — TELEPHONE (OUTPATIENT)
Dept: PRIMARY CARE | Facility: CLINIC | Age: 50
End: 2025-06-24

## 2025-06-24 ENCOUNTER — LAB (OUTPATIENT)
Dept: LAB | Facility: CLINIC | Age: 50
End: 2025-06-24
Payer: COMMERCIAL

## 2025-06-24 ENCOUNTER — TELEPHONE (OUTPATIENT)
Dept: HEMATOLOGY/ONCOLOGY | Facility: CLINIC | Age: 50
End: 2025-06-24

## 2025-06-24 DIAGNOSIS — D75.1 POLYCYTHEMIA: Primary | ICD-10-CM

## 2025-06-24 LAB
ERYTHROCYTE [DISTWIDTH] IN BLOOD BY AUTOMATED COUNT: 13.2 % (ref 11.5–14.5)
HCT VFR BLD AUTO: 54.9 % (ref 41–52)
HGB BLD-MCNC: 17.8 G/DL (ref 13.5–17.5)
MCH RBC QN AUTO: 29.6 PG (ref 26–34)
MCHC RBC AUTO-ENTMCNC: 32.4 G/DL (ref 32–36)
MCV RBC AUTO: 91 FL (ref 80–100)
NRBC BLD-RTO: 0 /100 WBCS (ref 0–0)
PLATELET # BLD AUTO: 282 X10*3/UL (ref 150–450)
RBC # BLD AUTO: 6.02 X10*6/UL (ref 4.5–5.9)
WBC # BLD AUTO: 6.8 X10*3/UL (ref 4.4–11.3)

## 2025-06-24 PROCEDURE — 36415 COLL VENOUS BLD VENIPUNCTURE: CPT | Performed by: FAMILY MEDICINE

## 2025-06-24 PROCEDURE — 85027 COMPLETE CBC AUTOMATED: CPT | Performed by: FAMILY MEDICINE

## 2025-06-24 NOTE — TELEPHONE ENCOUNTER
Call returned to patient and since he already had labs per orders Dr. Becker I advised he does not need another blood draw for the 7/24/25 FUV and possible phleb appt with Dr. Mason.  Informed we can do a CBC with his IV start if he prefers.  Pt appreciative and denies further questions or needs.

## 2025-06-24 NOTE — TELEPHONE ENCOUNTER
Patient came in today asking for some lab work that shows his red blood count he is going to wait in the waiting room for the order.

## 2025-06-24 NOTE — TELEPHONE ENCOUNTER
----- Message from Kaye S sent at 6/24/2025  9:30 AM EDT -----  Regarding: next appt  Jason came in today thinking his appt was today not 7/24, he already got lab work done.  Does he need new labs?  Requesting a call from a nurse to let him know.

## 2025-07-09 ENCOUNTER — APPOINTMENT (OUTPATIENT)
Dept: PRIMARY CARE | Facility: CLINIC | Age: 50
End: 2025-07-09
Payer: COMMERCIAL

## 2025-07-18 ASSESSMENT — ENCOUNTER SYMPTOMS: BACK PAIN: 1

## 2025-07-23 ENCOUNTER — APPOINTMENT (OUTPATIENT)
Dept: PRIMARY CARE | Facility: CLINIC | Age: 50
End: 2025-07-23
Payer: COMMERCIAL

## 2025-07-23 VITALS
DIASTOLIC BLOOD PRESSURE: 86 MMHG | SYSTOLIC BLOOD PRESSURE: 134 MMHG | WEIGHT: 203 LBS | TEMPERATURE: 98 F | RESPIRATION RATE: 16 BRPM | BODY MASS INDEX: 29.13 KG/M2 | HEART RATE: 97 BPM | OXYGEN SATURATION: 96 %

## 2025-07-23 DIAGNOSIS — M62.830 MUSCLE SPASM OF BACK: ICD-10-CM

## 2025-07-23 DIAGNOSIS — G89.29 CHRONIC RIGHT SACROILIAC JOINT PAIN: Primary | ICD-10-CM

## 2025-07-23 DIAGNOSIS — M53.3 CHRONIC RIGHT SACROILIAC JOINT PAIN: Primary | ICD-10-CM

## 2025-07-23 DIAGNOSIS — G89.29 CHRONIC RIGHT-SIDED LOW BACK PAIN WITHOUT SCIATICA: ICD-10-CM

## 2025-07-23 DIAGNOSIS — M21.70 ACQUIRED SHORT LEG SYNDROME ON LEFT: ICD-10-CM

## 2025-07-23 DIAGNOSIS — M54.50 CHRONIC RIGHT-SIDED LOW BACK PAIN WITHOUT SCIATICA: ICD-10-CM

## 2025-07-23 DIAGNOSIS — S76.811A STRAIN OF RIGHT ILIOPSOAS MUSCLE, INITIAL ENCOUNTER: ICD-10-CM

## 2025-07-23 PROCEDURE — 99213 OFFICE O/P EST LOW 20 MIN: CPT | Performed by: FAMILY MEDICINE

## 2025-07-23 PROCEDURE — 1036F TOBACCO NON-USER: CPT | Performed by: FAMILY MEDICINE

## 2025-07-23 RX ORDER — METHOCARBAMOL 500 MG/1
500 TABLET, FILM COATED ORAL DAILY PRN
Qty: 30 TABLET | Refills: 0 | Status: SHIPPED | OUTPATIENT
Start: 2025-07-23 | End: 2025-08-23

## 2025-07-23 ASSESSMENT — ENCOUNTER SYMPTOMS: BACK PAIN: 1

## 2025-07-23 NOTE — PROGRESS NOTES
Subjective   Patient ID: Jason Ceballos is a 50 y.o. male who presents for Back Pain.The patient was made aware that AI (artificial intelligence) technology will be utilized during today's visit. The patient expressed understanding and verbally accepts the use of AI technology.     Back Pain  This is a chronic problem. The current episode started more than 1 year ago. The problem occurs constantly. The problem is unchanged. The pain is present in the lumbar spine. The quality of the pain is described as aching. The pain radiates to the right thigh. The pain is at a severity of 6/10. The pain is The same all the time. The symptoms are aggravated by position and sitting. Stiffness is present All day.      History of Present Illness  Jason Ceballos is a 50 year old male who presents with chronic lower back pain.    He experiences chronic lower back pain, primarily localized in the lower back with occasional shifting to the inner crease on the right side. The pain is constant, with a baseline intensity of 3 to 4 out of 10, spiking to 6 or 7 with certain movements like quickly grabbing something off the floor. Sitting on the edge of the couch exacerbates the pain. No radiation of pain down the legs, weakness, numbness, or tingling. The pain does not affect his sleep. He had radiation of pain into his testicle, which was evaluated by urology, who recommended an Ortho evaluation.     He has a history of heavy deadlifting in his early years, resulting in significant back discomfort, though these incidents occurred years ago. He has not had any imaging studies of his back. He is able to perform physical activities such as weightlifting a few times a week, which seems to help manage the pain. He has a history of knee surgery on the left side.     For pain management, he takes Aleve sporadically due to concerns about kidney function. Aleve reduces the pain from a 7 to a 4 on bad days. He has used muscle relaxers (flexeril)  successfully in the past for workout-related muscle overuse but has not used them recently.    Review of Systems   Musculoskeletal:  Positive for back pain. Negative for gait problem.   All other systems reviewed and are negative.    Objective   /86 (BP Location: Right arm, Patient Position: Sitting, BP Cuff Size: Large adult)   Pulse 97   Temp 36.7 °C (98 °F) (Temporal)   Resp 16   Wt 92.1 kg (203 lb)   SpO2 96%   BMI 29.13 kg/m²     Physical Exam  Constitutional: Well developed, well nourished, alert and in no acute distress   Eyes: Normal external exam.   Cardiovascular: No peripheral edema.  Pulmonary: No respiratory distress  Skin: Warm, well perfused, normal skin turgor and color.   Neurologic: Cranial nerves II-XII grossly intact.   Psychiatric: Mood calm and affect normal.  Back: NO vertebral spinous process TTP, no SI joint TTP, negative laying seated flexion testing, left upslip (physiologic corrected with ME) with pelvic rotation to the right, +muscle spasm right lower lumbar and quadratus lumborum. Normal gait.     Assessment/Plan   Assessment & Plan  Chronic Low Back Pain  Chronic low back pain localized to the right SI joint, with a history of heavy lifting and previous gym injuries. Pain is constant at 3-4/10, spiking to 6-7/10 with certain movements. No red flag symptoms such as weakness, numbness, or tingling. Physical exam reveals a left pelvic rotation and a physiologic short leg on the left side. Pain is not affecting sleep. He has been managing pain with Aleve, which helps reduce pain from 7 to 4 on bad days. Stretching and exercise have been attempted with some relief. Imaging is not initially pursued due to lack of red flag symptoms and potential improvement with physical therapy. Surgery is considered a last resort if imaging reveals a herniated disc.  - Refer to physical therapy with Ed Tim at [facility] for comprehensive evaluation and treatment, including dry needling, cupping, and  ultrasound therapy.  - Prescribe methocarbamol (Robaxin) 30 pills, to be taken once daily as needed, with the option to take every 8 hours if necessary.  - Advise on iliopsoas and hip flexor stretches to address pelvic rotation and muscle tightness.  - Consider imaging if symptoms do not improve with physical therapy.  - Discuss potential for sports medicine referral for SI joint arthritis and possible steroid injection if needed.    General Health Maintenance  Discussion about kidney function and creatine use. Previous labs showed normal GFR and creatinine levels. He is cautious about kidney health due to past concerns. Advised that creatine can be taken with adequate hydration and monitoring of kidney function.  - Advise on adequate hydration when taking creatine.  - Reassure that kidney function is normal based on recent labs.

## 2025-07-24 ENCOUNTER — INFUSION (OUTPATIENT)
Dept: HEMATOLOGY/ONCOLOGY | Facility: CLINIC | Age: 50
End: 2025-07-24
Payer: COMMERCIAL

## 2025-07-24 ENCOUNTER — OFFICE VISIT (OUTPATIENT)
Dept: HEMATOLOGY/ONCOLOGY | Facility: CLINIC | Age: 50
End: 2025-07-24
Payer: COMMERCIAL

## 2025-07-24 ENCOUNTER — EDUCATION (OUTPATIENT)
Dept: HEMATOLOGY/ONCOLOGY | Facility: CLINIC | Age: 50
End: 2025-07-24

## 2025-07-24 VITALS — HEART RATE: 83 BPM | SYSTOLIC BLOOD PRESSURE: 134 MMHG | DIASTOLIC BLOOD PRESSURE: 92 MMHG

## 2025-07-24 VITALS
DIASTOLIC BLOOD PRESSURE: 91 MMHG | OXYGEN SATURATION: 95 % | HEART RATE: 81 BPM | BODY MASS INDEX: 29.2 KG/M2 | WEIGHT: 203.48 LBS | SYSTOLIC BLOOD PRESSURE: 144 MMHG | RESPIRATION RATE: 16 BRPM | TEMPERATURE: 98.1 F

## 2025-07-24 DIAGNOSIS — D75.1 POLYCYTHEMIA: ICD-10-CM

## 2025-07-24 DIAGNOSIS — E83.110 HEREDITARY HEMOCHROMATOSIS: Primary | ICD-10-CM

## 2025-07-24 DIAGNOSIS — E83.110 HEREDITARY HEMOCHROMATOSIS: ICD-10-CM

## 2025-07-24 LAB
BASOPHILS # BLD AUTO: 0.03 X10*3/UL (ref 0–0.1)
BASOPHILS NFR BLD AUTO: 0.5 %
EOSINOPHIL # BLD AUTO: 0.27 X10*3/UL (ref 0–0.7)
EOSINOPHIL NFR BLD AUTO: 4.1 %
ERYTHROCYTE [DISTWIDTH] IN BLOOD BY AUTOMATED COUNT: 12.9 % (ref 11.5–14.5)
HCT VFR BLD AUTO: 52.8 % (ref 41–52)
HGB BLD-MCNC: 17.9 G/DL (ref 13.5–17.5)
IMM GRANULOCYTES # BLD AUTO: 0.01 X10*3/UL (ref 0–0.7)
IMM GRANULOCYTES NFR BLD AUTO: 0.2 % (ref 0–0.9)
LYMPHOCYTES # BLD AUTO: 1.99 X10*3/UL (ref 1.2–4.8)
LYMPHOCYTES NFR BLD AUTO: 30.2 %
MCH RBC QN AUTO: 30.3 PG (ref 26–34)
MCHC RBC AUTO-ENTMCNC: 33.9 G/DL (ref 32–36)
MCV RBC AUTO: 90 FL (ref 80–100)
MONOCYTES # BLD AUTO: 0.52 X10*3/UL (ref 0.1–1)
MONOCYTES NFR BLD AUTO: 7.9 %
NEUTROPHILS # BLD AUTO: 3.76 X10*3/UL (ref 1.2–7.7)
NEUTROPHILS NFR BLD AUTO: 57.1 %
NRBC BLD-RTO: ABNORMAL /100{WBCS}
PLATELET # BLD AUTO: 265 X10*3/UL (ref 150–450)
RBC # BLD AUTO: 5.9 X10*6/UL (ref 4.5–5.9)
WBC # BLD AUTO: 6.6 X10*3/UL (ref 4.4–11.3)

## 2025-07-24 PROCEDURE — 99215 OFFICE O/P EST HI 40 MIN: CPT | Performed by: INTERNAL MEDICINE

## 2025-07-24 PROCEDURE — 82728 ASSAY OF FERRITIN: CPT

## 2025-07-24 PROCEDURE — 82306 VITAMIN D 25 HYDROXY: CPT

## 2025-07-24 PROCEDURE — 85025 COMPLETE CBC W/AUTO DIFF WBC: CPT

## 2025-07-24 PROCEDURE — 99195 PHLEBOTOMY: CPT

## 2025-07-24 PROCEDURE — 80048 BASIC METABOLIC PNL TOTAL CA: CPT

## 2025-07-24 PROCEDURE — 83540 ASSAY OF IRON: CPT

## 2025-07-24 RX ORDER — ALBUTEROL SULFATE 0.83 MG/ML
3 SOLUTION RESPIRATORY (INHALATION) AS NEEDED
OUTPATIENT
Start: 2025-08-21

## 2025-07-24 RX ORDER — DIPHENHYDRAMINE HYDROCHLORIDE 50 MG/ML
50 INJECTION, SOLUTION INTRAMUSCULAR; INTRAVENOUS AS NEEDED
OUTPATIENT
Start: 2025-08-21

## 2025-07-24 RX ORDER — EPINEPHRINE 0.3 MG/.3ML
0.3 INJECTION SUBCUTANEOUS EVERY 5 MIN PRN
Status: CANCELLED | OUTPATIENT
Start: 2025-07-24

## 2025-07-24 RX ORDER — FAMOTIDINE 10 MG/ML
20 INJECTION, SOLUTION INTRAVENOUS ONCE AS NEEDED
OUTPATIENT
Start: 2025-08-21

## 2025-07-24 RX ORDER — DIPHENHYDRAMINE HYDROCHLORIDE 50 MG/ML
50 INJECTION, SOLUTION INTRAMUSCULAR; INTRAVENOUS AS NEEDED
Status: CANCELLED | OUTPATIENT
Start: 2025-07-24

## 2025-07-24 RX ORDER — HEPARIN SODIUM,PORCINE/PF 10 UNIT/ML
50 SYRINGE (ML) INTRAVENOUS AS NEEDED
OUTPATIENT
Start: 2025-07-24

## 2025-07-24 RX ORDER — FAMOTIDINE 10 MG/ML
20 INJECTION, SOLUTION INTRAVENOUS ONCE AS NEEDED
Status: CANCELLED | OUTPATIENT
Start: 2025-07-24

## 2025-07-24 RX ORDER — ALBUTEROL SULFATE 0.83 MG/ML
3 SOLUTION RESPIRATORY (INHALATION) AS NEEDED
Status: CANCELLED | OUTPATIENT
Start: 2025-07-24

## 2025-07-24 RX ORDER — HEPARIN 100 UNIT/ML
500 SYRINGE INTRAVENOUS AS NEEDED
OUTPATIENT
Start: 2025-07-24

## 2025-07-24 RX ORDER — EPINEPHRINE 0.3 MG/.3ML
0.3 INJECTION SUBCUTANEOUS EVERY 5 MIN PRN
OUTPATIENT
Start: 2025-08-21

## 2025-07-24 ASSESSMENT — PAIN SCALES - GENERAL: PAINLEVEL_OUTOF10: 0-NO PAIN

## 2025-07-24 NOTE — PROGRESS NOTES
"Patient seen by Dr. Myers today in clinic. Reinforcement education provided regarding next steps with plan of care.      PER DR. 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 The patient is placed on therapeutic phlebotomy every 4 weeks to maintain hemoglobin at 15 g/dL. Return in 3 months \"    Reinforced phlebs every 4 weeks with FUV in 3 mos.  Pt states over the winter he had a phobia of going out of his house and is now improved and agreeable to plan of care for monthly phlebs.  Patient verbalizes understanding of plan of care via teachback method.             "

## 2025-07-24 NOTE — PROGRESS NOTES
[facility] Infusion Note     Jason Ceballos is a 50 y.o. year old male here today,07/24/25,  in the [facility] infusion center for phlebotomy. Hgb 17.9 today. RN reviewed blood results with patient.           Pt tolerated phlebotomy well and was discharged to home in stable condition. Vital signs stable. Patient denies any lightheadedness/dizziness. Pt ambulated  off the unit independently with a slow and steady gait. Pt had no further questions or concerns at this time.

## 2025-07-24 NOTE — PROGRESS NOTES
KOMAL ARCEO is a 48 year old Male referred for evaluation of elevated hemoglobin.  MPN studies were negative.   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        Interval History:    History of Present Illness:     A 48-year -old without significant past medical history, currently receiving testosterone therapy was referred for evaluation of elevated hemoglobin, hematocrit.  The patient is asymptomatic.     Denied history of weight loss, fevers, night sweats, chest pain , sob, nausea, vomitting, diarrhea, hematemesis,melena.    The patient had come for follow-up on 2024 regarding history of testosterone related erythrocytosis MPN studies were negative.    The patient had come for follow-up on 2025 regarding history of elevated hemoglobin hematocrit, heterozygosity for hereditary hemochromatosis gene C2 82Y, negative for H63D, MPN studies were negative as well as testosterone use.  The patient is placed on therapeutic phlebotomy every 4 weeks to maintain hemoglobin at 15 g/dL.     Past Medical History:     Elevated hemoglobin/hematocrit     Past Surgical History:     Knee surgery torn tendon repairulner nerve reposition both arms     Colonoscopy:          Family History:     Mother had bilateral mastectomies     Biological father congenital heart disease and  from it in his 60s.     Personal ans Social History:     48-years -old, , has 11 year old son,.  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  No ho smoking, alcohol or drug abuse.           Review of Systems:   ·  System Review All other systems have been reviewed and are negative for complaint.            Allergies and Intolerances:       Intolerances:         predniSONE: Drug, Unknown, Active     Outpatient Medication Profile:  * Outpatient Medication Status not yet specified          Surg History:         History of knee surgery: ICD-10: Z98.890,  Status: Active         History of hand surgery: ICD-10: Z98.890, Status: Active         History of shoulder surgery: ICD-10: Z98.890, Status: Active         History of surgery on arm: ICD-10: Z98.890, Status: Active     Family History: No Family History items are recorded  in the problem list.      Social History:   Social Substance History:  ·  Social History denies smoking, alcohol and drug use   ·  Smoking Status never smoker   ·  Tobacco Use denies   ·  Alcohol Use denies   ·  Drug Use denies            Vitals and Measurements:   Vitals: Temp: 37.1  HR: 78  RR: NA  BP: 131/90  SPO2%:   78   Measurements: HT(cm): 178.7  WT(kg): 91.3  BSA: 2.12   BMI:  28.5         Lab Results:           Assessment and Plan:   MPN studies were negative.  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        A 48-year -old without significant past medical history, currently receiving testosterone therapy was referred for evaluation of elevated hemoglobin, hematocrit.  The patient is asymptomatic.     Had a detailed discussion with the patient and explained about significance and manifestation with elevated hemoglobin. differential diagnosis includes myeloproliferative syndrome, secondary cuases such as drug induces, chronic hypoxia vs others.  .  Suggest  Patient requesting to donate blood at the [place]/blood bank  Decrease Testosterone dose as much as possible.  Dietary consult.  The patient had come for follow-up on August 2, 2024 regarding history of testosterone related erythrocytosis MPN studies were negative.  The patient is doing reasonably well but did not go for blood donation.  A CBC on July 8, 2024 revealed WBC 8.7, hemoglobin 17.9 g/dL, hematocrit 56.2, platelets 289,000/mm³.  Recommend therapeutic phlebotomies every 4 weeks to maintain hemoglobin at 15 g/dL.  Return in 3 months.    The patient had come for follow-up on July 24, 2025 regarding history of elevated hemoglobin hematocrit, heterozygosity for  hereditary hemochromatosis gene C2 82Y, negative for H63D, MPN studies were negative as well as testosterone use.  The patient is placed on therapeutic phlebotomy every 4 weeks to maintain hemoglobin at 15 g/dL.  Return in 3 months

## 2025-07-25 ENCOUNTER — TELEPHONE (OUTPATIENT)
Dept: HEMATOLOGY/ONCOLOGY | Facility: CLINIC | Age: 50
End: 2025-07-25
Payer: COMMERCIAL

## 2025-07-25 LAB
25(OH)D3 SERPL-MCNC: 195 NG/ML (ref 30–100)
ANION GAP SERPL CALC-SCNC: 14 MMOL/L (ref 10–20)
BUN SERPL-MCNC: 23 MG/DL (ref 6–23)
CALCIUM SERPL-MCNC: 10.3 MG/DL (ref 8.6–10.6)
CHLORIDE SERPL-SCNC: 105 MMOL/L (ref 98–107)
CO2 SERPL-SCNC: 24 MMOL/L (ref 21–32)
CREAT SERPL-MCNC: 1.15 MG/DL (ref 0.5–1.3)
EGFRCR SERPLBLD CKD-EPI 2021: 78 ML/MIN/1.73M*2
FERRITIN SERPL-MCNC: 106 NG/ML (ref 20–300)
GLUCOSE SERPL-MCNC: 101 MG/DL (ref 74–99)
IRON SATN MFR SERPL: 40 % (ref 25–45)
IRON SERPL-MCNC: 135 UG/DL (ref 35–150)
POTASSIUM SERPL-SCNC: 4.5 MMOL/L (ref 3.5–5.3)
SODIUM SERPL-SCNC: 138 MMOL/L (ref 136–145)
TIBC SERPL-MCNC: 337 UG/DL (ref 240–445)
UIBC SERPL-MCNC: 202 UG/DL (ref 110–370)

## 2025-07-25 NOTE — TELEPHONE ENCOUNTER
Call placed and spoke with pt regarding his Vitamin D level being elevated, and advised pt to stop taking his vitamin D as directed per Dr Mason. Pt verbalized understanding per teach back method and is agreeable to sop taking his vitamin D. Pt appreciative of the call.

## 2025-08-15 ENCOUNTER — LAB (OUTPATIENT)
Dept: LAB | Facility: CLINIC | Age: 50
End: 2025-08-15
Payer: COMMERCIAL

## 2025-08-15 DIAGNOSIS — Z79.899 MEDICATION MANAGEMENT: ICD-10-CM

## 2025-08-15 DIAGNOSIS — D75.1 POLYCYTHEMIA: ICD-10-CM

## 2025-08-15 DIAGNOSIS — R79.89 ELEVATED TSH: ICD-10-CM

## 2025-08-15 DIAGNOSIS — E83.110 HEREDITARY HEMOCHROMATOSIS: ICD-10-CM

## 2025-08-15 LAB
BASOPHILS # BLD AUTO: 0.04 X10*3/UL (ref 0–0.1)
BASOPHILS NFR BLD AUTO: 0.4 %
EOSINOPHIL # BLD AUTO: 0.27 X10*3/UL (ref 0–0.7)
EOSINOPHIL NFR BLD AUTO: 2.9 %
ERYTHROCYTE [DISTWIDTH] IN BLOOD BY AUTOMATED COUNT: 12.4 % (ref 11.5–14.5)
HCT VFR BLD AUTO: 49.3 % (ref 41–52)
HGB BLD-MCNC: 16.3 G/DL (ref 13.5–17.5)
IMM GRANULOCYTES # BLD AUTO: 0.02 X10*3/UL (ref 0–0.7)
IMM GRANULOCYTES NFR BLD AUTO: 0.2 % (ref 0–0.9)
LYMPHOCYTES # BLD AUTO: 2.06 X10*3/UL (ref 1.2–4.8)
LYMPHOCYTES NFR BLD AUTO: 22.2 %
MCH RBC QN AUTO: 29.7 PG (ref 26–34)
MCHC RBC AUTO-ENTMCNC: 33.1 G/DL (ref 32–36)
MCV RBC AUTO: 90 FL (ref 80–100)
MONOCYTES # BLD AUTO: 0.6 X10*3/UL (ref 0.1–1)
MONOCYTES NFR BLD AUTO: 6.5 %
NEUTROPHILS # BLD AUTO: 6.27 X10*3/UL (ref 1.2–7.7)
NEUTROPHILS NFR BLD AUTO: 67.8 %
NRBC BLD-RTO: NORMAL /100{WBCS}
PLATELET # BLD AUTO: 271 X10*3/UL (ref 150–450)
RBC # BLD AUTO: 5.48 X10*6/UL (ref 4.5–5.9)
WBC # BLD AUTO: 9.3 X10*3/UL (ref 4.4–11.3)

## 2025-08-15 PROCEDURE — 82105 ALPHA-FETOPROTEIN SERUM: CPT

## 2025-08-15 PROCEDURE — 84443 ASSAY THYROID STIM HORMONE: CPT

## 2025-08-15 PROCEDURE — 82728 ASSAY OF FERRITIN: CPT

## 2025-08-15 PROCEDURE — 83540 ASSAY OF IRON: CPT

## 2025-08-15 PROCEDURE — 85025 COMPLETE CBC W/AUTO DIFF WBC: CPT

## 2025-08-15 PROCEDURE — 36415 COLL VENOUS BLD VENIPUNCTURE: CPT

## 2025-08-15 PROCEDURE — 80053 COMPREHEN METABOLIC PANEL: CPT

## 2025-08-15 PROCEDURE — 80178 ASSAY OF LITHIUM: CPT

## 2025-08-16 LAB
AFP SERPL-MCNC: <4 NG/ML (ref 0–9)
ALBUMIN SERPL BCP-MCNC: 4.7 G/DL (ref 3.4–5)
ALP SERPL-CCNC: 45 U/L (ref 33–120)
ALT SERPL W P-5'-P-CCNC: 31 U/L (ref 10–52)
ANION GAP SERPL CALC-SCNC: 14 MMOL/L (ref 10–20)
ANION GAP SERPL CALC-SCNC: 14 MMOL/L (ref 10–20)
AST SERPL W P-5'-P-CCNC: 32 U/L (ref 9–39)
BILIRUB SERPL-MCNC: 0.5 MG/DL (ref 0–1.2)
BUN SERPL-MCNC: 22 MG/DL (ref 6–23)
BUN SERPL-MCNC: 22 MG/DL (ref 6–23)
CALCIUM SERPL-MCNC: 10.1 MG/DL (ref 8.6–10.6)
CALCIUM SERPL-MCNC: 10.1 MG/DL (ref 8.6–10.6)
CHLORIDE SERPL-SCNC: 103 MMOL/L (ref 98–107)
CHLORIDE SERPL-SCNC: 103 MMOL/L (ref 98–107)
CO2 SERPL-SCNC: 26 MMOL/L (ref 21–32)
CO2 SERPL-SCNC: 26 MMOL/L (ref 21–32)
CREAT SERPL-MCNC: 1.33 MG/DL (ref 0.5–1.3)
CREAT SERPL-MCNC: 1.33 MG/DL (ref 0.5–1.3)
EGFRCR SERPLBLD CKD-EPI 2021: 65 ML/MIN/1.73M*2
EGFRCR SERPLBLD CKD-EPI 2021: 65 ML/MIN/1.73M*2
FERRITIN SERPL-MCNC: 47 NG/ML (ref 20–300)
GLUCOSE SERPL-MCNC: 97 MG/DL (ref 74–99)
GLUCOSE SERPL-MCNC: 97 MG/DL (ref 74–99)
IRON SATN MFR SERPL: 16 % (ref 25–45)
IRON SERPL-MCNC: 60 UG/DL (ref 35–150)
LITHIUM SERPL-SCNC: 0.3 MMOL/L (ref 0.6–1.2)
POTASSIUM SERPL-SCNC: 5.3 MMOL/L (ref 3.5–5.3)
POTASSIUM SERPL-SCNC: 5.3 MMOL/L (ref 3.5–5.3)
PROT SERPL-MCNC: 7.2 G/DL (ref 6.4–8.2)
SODIUM SERPL-SCNC: 138 MMOL/L (ref 136–145)
SODIUM SERPL-SCNC: 138 MMOL/L (ref 136–145)
TIBC SERPL-MCNC: 381 UG/DL (ref 240–445)
TSH SERPL-ACNC: 2.37 MIU/L (ref 0.44–3.98)
UIBC SERPL-MCNC: 321 UG/DL (ref 110–370)

## 2025-08-21 ENCOUNTER — APPOINTMENT (OUTPATIENT)
Dept: HEMATOLOGY/ONCOLOGY | Facility: CLINIC | Age: 50
End: 2025-08-21
Payer: COMMERCIAL